# Patient Record
Sex: FEMALE | Race: WHITE | NOT HISPANIC OR LATINO | Employment: PART TIME | URBAN - METROPOLITAN AREA
[De-identification: names, ages, dates, MRNs, and addresses within clinical notes are randomized per-mention and may not be internally consistent; named-entity substitution may affect disease eponyms.]

---

## 2017-07-11 ENCOUNTER — PROCEDURE VISIT (OUTPATIENT)
Dept: OBSTETRICS AND GYNECOLOGY | Age: 62
End: 2017-07-11

## 2017-07-11 ENCOUNTER — OFFICE VISIT (OUTPATIENT)
Dept: OBSTETRICS AND GYNECOLOGY | Age: 62
End: 2017-07-11

## 2017-07-11 VITALS
BODY MASS INDEX: 23.21 KG/M2 | SYSTOLIC BLOOD PRESSURE: 120 MMHG | WEIGHT: 131 LBS | DIASTOLIC BLOOD PRESSURE: 80 MMHG | HEIGHT: 63 IN

## 2017-07-11 DIAGNOSIS — Z00.00 ANNUAL PHYSICAL EXAM: Primary | ICD-10-CM

## 2017-07-11 DIAGNOSIS — Z78.0 POST-MENOPAUSAL: Primary | ICD-10-CM

## 2017-07-11 DIAGNOSIS — M85.80 OSTEOPENIA: ICD-10-CM

## 2017-07-11 PROCEDURE — 99396 PREV VISIT EST AGE 40-64: CPT | Performed by: OBSTETRICS & GYNECOLOGY

## 2017-07-11 PROCEDURE — 77080 DXA BONE DENSITY AXIAL: CPT | Performed by: OBSTETRICS & GYNECOLOGY

## 2017-07-11 NOTE — PROGRESS NOTES
Subjective   Hanna Valladares is a 62 y.o. female is being seen today for   Chief Complaint   Patient presents with   • Gynecologic Exam     annual   .    History of Present Illness  Patient is here for an annual check.  Overall she is doing very well with no problems today nothing during the past year.  Her father is 87 has Parkinson's and some early dementia so she and her sister helped to care of him but he has a second wife who is a bit younger and she is very good also.  Her son Rashard just got  last month all seems happy with that.  No other complaints  The following portions of the patient's history were reviewed and updated as appropriate: allergies, current medications, past family history, past medical history, past social history, past surgical history and problem list.    PAST MEDICAL HISTORY  Past Medical History:   Diagnosis Date   • Depression    • Seasonal allergies      OB History      Para Term  AB TAB SAB Ectopic Multiple Living    2 2 2               Past Surgical History:   Procedure Laterality Date   • BREAST BIOPSY     • BUNIONECTOMY Right    • COLONOSCOPY N/A 2016    Procedure: COLONOSCOPY TO CECUM & T.I. WITH HOT SNARE POLYPECTOMIES AND COLD POLYPECTOMIES;  Surgeon: Beronica Jj MD;  Location: [facility] ENDOSCOPY;  Service:    • TONSILLECTOMY       Family History   Problem Relation Age of Onset   • No Known Problems Mother    • No Known Problems Father    • No Known Problems Sister    • No Known Problems Brother    • No Known Problems Daughter    • No Known Problems Son    • No Known Problems Maternal Grandmother    • No Known Problems Paternal Grandmother    • No Known Problems Maternal Aunt    • No Known Problems Paternal Aunt    • BRCA 1/2 Neg Hx    • Breast cancer Neg Hx    • Colon cancer Neg Hx    • Endometrial cancer Neg Hx    • Ovarian cancer Neg Hx      History   Smoking Status   • Never Smoker   Smokeless Tobacco   • Not on file       Current Outpatient  Prescriptions:   •  calcium carbonate-vitamin d (CALTRATE 600+D) 600-400 MG-UNIT per tablet, Take 1 tablet by mouth daily., Disp: , Rfl:   •  Loratadine-Pseudoephedrine (CLARITIN-D 24 HOUR PO), Take  by mouth., Disp: , Rfl:   •  pramipexole (MIRAPEX) 0.5 MG tablet, Take 0.5 mg by mouth 3 (three) times a day., Disp: , Rfl:   •  saccharomyces boulardii (FLORASTOR) 250 MG capsule, Take 250 mg by mouth 2 (two) times a day., Disp: , Rfl:   •  sertraline (ZOLOFT) 50 MG tablet, TAKE ONE TABLET BY MOUTH DAILY, Disp: 90 tablet, Rfl: 0    There is no immunization history on file for this patient.    Review of Systems   Constitutional: Negative for chills, fatigue, fever and unexpected weight change.   Respiratory: Negative for shortness of breath and wheezing.    Cardiovascular: Negative for chest pain.   Gastrointestinal: Negative for abdominal distention, abdominal pain, blood in stool, constipation, diarrhea and nausea.   Genitourinary: Negative for difficulty urinating, dyspareunia, dysuria, frequency, hematuria, menstrual problem, pelvic pain, urgency and vaginal discharge.   Skin: Negative for rash.       Objective   Physical Exam   Constitutional: She is oriented to person, place, and time. Vital signs are normal. She appears well-developed and well-nourished.   Neck: No thyromegaly present.   Cardiovascular: Normal rate, regular rhythm and normal heart sounds.    Pulmonary/Chest: Effort normal. Right breast exhibits no inverted nipple, no mass, no nipple discharge, no skin change and no tenderness. Left breast exhibits no inverted nipple, no mass, no nipple discharge, no skin change and no tenderness. Breasts are symmetrical. There is no breast swelling.   Abdominal: Soft.   Genitourinary: Vagina normal and uterus normal. No breast tenderness, discharge or bleeding. Pelvic exam was performed with patient supine. No labial fusion. There is no rash, tenderness, lesion or injury on the right labia. There is no rash,  tenderness, lesion or injury on the left labia. Cervix exhibits no motion tenderness, no discharge and no friability. Right adnexum displays no mass, no tenderness and no fullness. Left adnexum displays no mass, no tenderness and no fullness.   Neurological: She is alert and oriented to person, place, and time.   Psychiatric: She has a normal mood and affect.   Vitals reviewed.        Assessment/Plan   Hanna was seen today for gynecologic exam.    Diagnoses and all orders for this visit:    Annual physical exam    Osteopenia    Exam was normal today.  Pap smear will be due next year.  Mammogram is due today and done today.  Bone density all done today with the hip and spine showing some mild to moderate osteopenia and that was discussed with the patient.  And colonoscopy was last year and she is to come back in 3 years from August 2016.  Come back in year  Patient was counseled for having according to her age including diet exercise etc.

## 2018-07-17 ENCOUNTER — APPOINTMENT (OUTPATIENT)
Dept: WOMENS IMAGING | Facility: HOSPITAL | Age: 63
End: 2018-07-17

## 2018-07-17 ENCOUNTER — OFFICE VISIT (OUTPATIENT)
Dept: OBSTETRICS AND GYNECOLOGY | Age: 63
End: 2018-07-17

## 2018-07-17 VITALS
SYSTOLIC BLOOD PRESSURE: 118 MMHG | DIASTOLIC BLOOD PRESSURE: 80 MMHG | HEIGHT: 62 IN | WEIGHT: 136 LBS | BODY MASS INDEX: 25.03 KG/M2

## 2018-07-17 DIAGNOSIS — Z12.4 SCREENING FOR CERVICAL CANCER: ICD-10-CM

## 2018-07-17 DIAGNOSIS — Z11.51 SCREENING FOR HPV (HUMAN PAPILLOMAVIRUS): ICD-10-CM

## 2018-07-17 DIAGNOSIS — Z00.00 ANNUAL PHYSICAL EXAM: Primary | ICD-10-CM

## 2018-07-17 DIAGNOSIS — Z86.010 HISTORY OF COLON POLYPS: ICD-10-CM

## 2018-07-17 PROBLEM — Z86.0100 HISTORY OF COLON POLYPS: Status: ACTIVE | Noted: 2018-07-17

## 2018-07-17 PROCEDURE — 77067 SCR MAMMO BI INCL CAD: CPT | Performed by: RADIOLOGY

## 2018-07-17 PROCEDURE — 99396 PREV VISIT EST AGE 40-64: CPT | Performed by: OBSTETRICS & GYNECOLOGY

## 2018-07-17 RX ORDER — PRAMIPEXOLE DIHYDROCHLORIDE 0.5 MG/1
TABLET ORAL
COMMUNITY
Start: 2018-07-13

## 2018-07-17 NOTE — PROGRESS NOTES
Subjective   aHnna Valladares is a 63 y.o. female is being seen today for   Chief Complaint   Patient presents with   • Annual Exam     PT HERE FOR ROUTINE AE AND MG.   .    History of Present Illness  Patient is here for an annual exam.  Unfortunately her father passed away this past March at 88 area her family is doing well no grandchildren yet.  She has no specific complaints today nothing during the past year her bowels and bladder work well and she exercises place a lot of tenderness.  No other complaints  The following portions of the patient's history were reviewed and updated as appropriate: allergies, current medications, past family history, past medical history, past social history, past surgical history and problem list.    Vitals:    18 0951   BP: 118/80       PAST MEDICAL HISTORY  Past Medical History:   Diagnosis Date   • Depression    • Seasonal allergies      OB History      Para Term  AB Living    2 2 2          SAB TAB Ectopic Molar Multiple Live Births                       Past Surgical History:   Procedure Laterality Date   • BREAST BIOPSY     • BUNIONECTOMY Right    • COLONOSCOPY N/A 2016    Procedure: COLONOSCOPY TO CECUM & T.I. WITH HOT SNARE POLYPECTOMIES AND COLD POLYPECTOMIES;  Surgeon: Beronica Jj MD;  Location: [facility] ENDOSCOPY;  Service:    • TONSILLECTOMY       Family History   Problem Relation Age of Onset   • No Known Problems Mother    • No Known Problems Father    • No Known Problems Sister    • No Known Problems Brother    • No Known Problems Daughter    • No Known Problems Son    • No Known Problems Maternal Grandmother    • No Known Problems Paternal Grandmother    • No Known Problems Maternal Aunt    • No Known Problems Paternal Aunt    • BRCA 1/2 Neg Hx    • Breast cancer Neg Hx    • Colon cancer Neg Hx    • Endometrial cancer Neg Hx    • Ovarian cancer Neg Hx      History   Smoking Status   • Never Smoker   Smokeless Tobacco   • Not on file        Current Outpatient Prescriptions:   •  calcium carbonate-vitamin d (CALTRATE 600+D) 600-400 MG-UNIT per tablet, Take 1 tablet by mouth daily., Disp: , Rfl:   •  Loratadine-Pseudoephedrine (CLARITIN-D 24 HOUR PO), Take  by mouth., Disp: , Rfl:   •  pramipexole (MIRAPEX) 0.5 MG tablet, , Disp: , Rfl:   •  sertraline (ZOLOFT) 50 MG tablet, TAKE ONE TABLET BY MOUTH DAILY, Disp: 90 tablet, Rfl: 0    There is no immunization history on file for this patient.    Review of Systems   Constitutional: Negative for chills, fatigue, fever and unexpected weight change.   Respiratory: Negative for shortness of breath and wheezing.    Cardiovascular: Negative for chest pain.   Gastrointestinal: Negative for abdominal distention, abdominal pain, blood in stool, constipation, diarrhea and nausea.   Genitourinary: Negative for difficulty urinating, dyspareunia, dysuria, frequency, hematuria, menstrual problem, pelvic pain, urgency and vaginal discharge.   Skin: Negative for rash.       Objective   Physical Exam   Constitutional: She is oriented to person, place, and time. Vital signs are normal. She appears well-developed and well-nourished.   Neck: No thyromegaly present.   Cardiovascular: Normal rate, regular rhythm and normal heart sounds.    Pulmonary/Chest: Effort normal. Right breast exhibits no inverted nipple, no mass, no nipple discharge, no skin change and no tenderness. Left breast exhibits no inverted nipple, no mass, no nipple discharge, no skin change and no tenderness. Breasts are symmetrical. There is no breast swelling.   Abdominal: Soft.   Genitourinary: Vagina normal and uterus normal. No breast tenderness, discharge or bleeding. Pelvic exam was performed with patient supine. No labial fusion. There is no rash, tenderness, lesion or injury on the right labia. There is no rash, tenderness, lesion or injury on the left labia. Cervix exhibits no motion tenderness, no discharge and no friability. Right adnexum  displays no mass, no tenderness and no fullness. Left adnexum displays no mass, no tenderness and no fullness.   Neurological: She is alert and oriented to person, place, and time.   Psychiatric: She has a normal mood and affect.   Vitals reviewed.        Assessment/Plan   Hanna was seen today for annual exam.    Diagnoses and all orders for this visit:    Annual physical exam    History of colon polyps    My exam was normal today.  Mammogram was done today.  Pap smear done today.  Bone density last year with osteopenia and colonoscopy last year with some polyps so she is on either 3 or 5 year schedule again exercise discussed come back in a year

## 2018-07-20 LAB
CYTOLOGIST CVX/VAG CYTO: NORMAL
CYTOLOGY CVX/VAG DOC THIN PREP: NORMAL
DX ICD CODE: NORMAL
HIV 1 & 2 AB SER-IMP: NORMAL
HPV I/H RISK 4 DNA CVX QL PROBE+SIG AMP: NEGATIVE
OTHER STN SPEC: NORMAL
PATH REPORT.FINAL DX SPEC: NORMAL
STAT OF ADQ CVX/VAG CYTO-IMP: NORMAL

## 2019-07-19 ENCOUNTER — APPOINTMENT (OUTPATIENT)
Dept: WOMENS IMAGING | Facility: HOSPITAL | Age: 64
End: 2019-07-19

## 2019-07-19 ENCOUNTER — PROCEDURE VISIT (OUTPATIENT)
Dept: OBSTETRICS AND GYNECOLOGY | Age: 64
End: 2019-07-19

## 2019-07-19 ENCOUNTER — OFFICE VISIT (OUTPATIENT)
Dept: OBSTETRICS AND GYNECOLOGY | Age: 64
End: 2019-07-19

## 2019-07-19 VITALS
WEIGHT: 130 LBS | HEIGHT: 62 IN | SYSTOLIC BLOOD PRESSURE: 130 MMHG | DIASTOLIC BLOOD PRESSURE: 82 MMHG | BODY MASS INDEX: 23.92 KG/M2

## 2019-07-19 DIAGNOSIS — F41.9 ANXIETY: ICD-10-CM

## 2019-07-19 DIAGNOSIS — Z12.31 VISIT FOR SCREENING MAMMOGRAM: Primary | ICD-10-CM

## 2019-07-19 DIAGNOSIS — Z00.00 ANNUAL PHYSICAL EXAM: Primary | ICD-10-CM

## 2019-07-19 PROCEDURE — 99396 PREV VISIT EST AGE 40-64: CPT | Performed by: OBSTETRICS & GYNECOLOGY

## 2019-07-19 PROCEDURE — 77067 SCR MAMMO BI INCL CAD: CPT | Performed by: RADIOLOGY

## 2019-07-19 PROCEDURE — 77067 SCR MAMMO BI INCL CAD: CPT | Performed by: OBSTETRICS & GYNECOLOGY

## 2019-07-19 RX ORDER — FLUTICASONE PROPIONATE 50 MCG
SPRAY, SUSPENSION (ML) NASAL
COMMUNITY
Start: 2019-05-15

## 2019-07-19 NOTE — PROGRESS NOTES
Subjective   Hanna Valladares is a 64 y.o. female is being seen today for   Chief Complaint   Patient presents with   • Gynecologic Exam     AE and MG today.  2018 neg pap, neg HPV.  Dexa  = osteopenia.   C-scope 2016 = polyps.  PM no HRT.    .    History of Present Illness  Patient is here for annual exam.  Overall she doing very well medically with no new problems nothing during the past year.  Nothing in the family.  Her main problem today so to speak with her relationship with her daughter.  Seems that way back when when the daughter was a teenager she had a relationship that the parents do not approve a and that has I think on to the daughter ever since although she is happily  at this point.  But the relationship is strained.  At one point the receding seeing Jacque showed low ride recommended that seem to help some but bodies past so the patient is a would like to talk to someone even if the daughter would not.  Nothing else during the family bowels and bladder work well she is really doing her exercise room.  The following portions of the patient's history were reviewed and updated as appropriate: allergies, current medications, past family history, past medical history, past social history, past surgical history and problem list.    Vitals:    19 0926   BP: 130/82       PAST MEDICAL HISTORY  Past Medical History:   Diagnosis Date   • Colon polyps    • Depression    • Seasonal allergies      OB History      Para Term  AB Living    2 2 2     2    SAB TAB Ectopic Molar Multiple Live Births              2        Past Surgical History:   Procedure Laterality Date   • BREAST BIOPSY     • BUNIONECTOMY Right    • COLONOSCOPY N/A 2016    Procedure: COLONOSCOPY TO CECUM & T.I. WITH HOT SNARE POLYPECTOMIES AND COLD POLYPECTOMIES;  Surgeon: Beronica Jj MD;  Location: [facility] ENDOSCOPY;  Service:    • TONSILLECTOMY       Family History   Problem Relation Age of Onset   • No Known  Problems Mother    • No Known Problems Father    • No Known Problems Sister    • No Known Problems Brother    • No Known Problems Daughter    • No Known Problems Son    • No Known Problems Maternal Grandmother    • No Known Problems Paternal Grandmother    • No Known Problems Maternal Aunt    • No Known Problems Paternal Aunt    • BRCA 1/2 Neg Hx    • Breast cancer Neg Hx    • Colon cancer Neg Hx    • Endometrial cancer Neg Hx    • Ovarian cancer Neg Hx      Social History     Tobacco Use   Smoking Status Never Smoker   Smokeless Tobacco Never Used       Current Outpatient Medications:   •  calcium carbonate-vitamin d (CALTRATE 600+D) 600-400 MG-UNIT per tablet, Take 1 tablet by mouth daily., Disp: , Rfl:   •  fluticasone (FLONASE) 50 MCG/ACT nasal spray, , Disp: , Rfl:   •  Loratadine-Pseudoephedrine (CLARITIN-D 24 HOUR PO), Take  by mouth., Disp: , Rfl:   •  pramipexole (MIRAPEX) 0.5 MG tablet, , Disp: , Rfl:   •  sertraline (ZOLOFT) 50 MG tablet, TAKE ONE TABLET BY MOUTH DAILY, Disp: 90 tablet, Rfl: 3    There is no immunization history on file for this patient.    Review of Systems   Constitutional: Negative for chills, fatigue, fever and unexpected weight change.   Respiratory: Negative for shortness of breath and wheezing.    Cardiovascular: Negative for chest pain.   Gastrointestinal: Negative for abdominal distention, abdominal pain, blood in stool, constipation, diarrhea and nausea.   Genitourinary: Negative for difficulty urinating, dyspareunia, dysuria, frequency, hematuria, menstrual problem, pelvic pain, urgency and vaginal discharge.   Skin: Negative for rash.   Psychiatric/Behavioral: The patient is nervous/anxious.        Objective   Physical Exam   Constitutional: She is oriented to person, place, and time. Vital signs are normal. She appears well-developed and well-nourished.   Neck: No thyromegaly present.   Cardiovascular: Normal rate, regular rhythm and normal heart sounds.   Pulmonary/Chest:  Effort normal. Right breast exhibits no inverted nipple, no mass, no nipple discharge, no skin change and no tenderness. Left breast exhibits no inverted nipple, no mass, no nipple discharge, no skin change and no tenderness. Breasts are symmetrical. There is no breast swelling.   Abdominal: Soft.   Genitourinary: Vagina normal and uterus normal. No breast tenderness, discharge or bleeding. Pelvic exam was performed with patient supine. No labial fusion. There is no rash, tenderness, lesion or injury on the right labia. There is no rash, tenderness, lesion or injury on the left labia. Cervix exhibits no motion tenderness, no discharge and no friability. Right adnexum displays no mass, no tenderness and no fullness. Left adnexum displays no mass, no tenderness and no fullness.   Neurological: She is alert and oriented to person, place, and time.   Psychiatric: She has a normal mood and affect.   Vitals reviewed.        Assessment/Plan   Hanna was seen today for gynecologic exam.    Diagnoses and all orders for this visit:    Annual physical exam    Anxiety      Overall normal exam today.  Pap was done in 18 so not done today.  Mammogram is being done today.  Bone density showed osteopenia 2 years ago moderate level and will repeat that next year.  In the colonoscopy was 3 years ago but showed 5 polyps with 3 tubular adenomas some can have her call her GI person and just to make sure the next one is at 3 or 5 years.  Again very good on the exercise and will have her back in a year  And will give her names of a couple people to talk to

## 2019-08-30 ENCOUNTER — PREP FOR SURGERY (OUTPATIENT)
Dept: OTHER | Facility: HOSPITAL | Age: 64
End: 2019-08-30

## 2019-08-30 DIAGNOSIS — D36.9 ADENOMATOUS POLYPS: Primary | ICD-10-CM

## 2019-11-08 ENCOUNTER — PREP FOR SURGERY (OUTPATIENT)
Dept: OTHER | Facility: HOSPITAL | Age: 64
End: 2019-11-08

## 2019-11-08 DIAGNOSIS — D12.6 ADENOMATOUS POLYP OF COLON, UNSPECIFIED PART OF COLON: Primary | ICD-10-CM

## 2019-11-14 PROBLEM — D12.6 ADENOMATOUS POLYP OF COLON: Status: ACTIVE | Noted: 2019-11-14

## 2020-05-20 ENCOUNTER — TELEPHONE (OUTPATIENT)
Dept: OBSTETRICS AND GYNECOLOGY | Age: 65
End: 2020-05-20

## 2020-06-22 ENCOUNTER — TELEPHONE (OUTPATIENT)
Dept: OBSTETRICS AND GYNECOLOGY | Age: 65
End: 2020-06-22

## 2020-08-07 NOTE — TELEPHONE ENCOUNTER
(Delia/ Vel pt) Pt is needing refills on her zoloft until her appt with Dr. Crowder on 9/4/2020, can we do this? Pharmacy verified.     [phone]

## 2020-09-04 ENCOUNTER — PROCEDURE VISIT (OUTPATIENT)
Dept: OBSTETRICS AND GYNECOLOGY | Age: 65
End: 2020-09-04

## 2020-09-04 ENCOUNTER — OFFICE VISIT (OUTPATIENT)
Dept: OBSTETRICS AND GYNECOLOGY | Age: 65
End: 2020-09-04

## 2020-09-04 ENCOUNTER — APPOINTMENT (OUTPATIENT)
Dept: WOMENS IMAGING | Facility: HOSPITAL | Age: 65
End: 2020-09-04

## 2020-09-04 VITALS
WEIGHT: 134 LBS | SYSTOLIC BLOOD PRESSURE: 112 MMHG | HEIGHT: 62 IN | BODY MASS INDEX: 24.66 KG/M2 | DIASTOLIC BLOOD PRESSURE: 72 MMHG

## 2020-09-04 DIAGNOSIS — M85.80 OSTEOPENIA, SENILE: ICD-10-CM

## 2020-09-04 DIAGNOSIS — Z12.31 VISIT FOR SCREENING MAMMOGRAM: Primary | ICD-10-CM

## 2020-09-04 DIAGNOSIS — Z78.0 POST-MENOPAUSAL: ICD-10-CM

## 2020-09-04 DIAGNOSIS — Z01.419 ENCOUNTER FOR GYNECOLOGICAL EXAMINATION WITHOUT ABNORMAL FINDING: Primary | ICD-10-CM

## 2020-09-04 PROCEDURE — 77080 DXA BONE DENSITY AXIAL: CPT | Performed by: OBSTETRICS & GYNECOLOGY

## 2020-09-04 PROCEDURE — 99397 PER PM REEVAL EST PAT 65+ YR: CPT | Performed by: OBSTETRICS & GYNECOLOGY

## 2020-09-04 PROCEDURE — 77067 SCR MAMMO BI INCL CAD: CPT | Performed by: OBSTETRICS & GYNECOLOGY

## 2020-09-04 PROCEDURE — 77067 SCR MAMMO BI INCL CAD: CPT | Performed by: RADIOLOGY

## 2020-09-04 RX ORDER — ALENDRONATE SODIUM 70 MG/1
70 TABLET ORAL
Qty: 4 TABLET | Refills: 12 | Status: SHIPPED | OUTPATIENT
Start: 2020-09-04 | End: 2021-09-09 | Stop reason: SDUPTHER

## 2020-09-04 NOTE — PROGRESS NOTES
Routine Annual Visit    2020    Patient: Hanna Valladares          MR#:[id number]      Chief Complaint   Patient presents with   • Gynecologic Exam     Former Delia pt. PM no HRT.   AE and MG, DEXA  before exam today. Last pap 2018 = neg, neg.  DEXA 2017 = ostoepenia.  C-scope 2016 polyps, she is repeating soon.          History of Present Illness    65 y.o. female  who presents for annual exam.   Feeling well  No regular exercise  dexa reviewed today and worsening osteopenia, especially at spine  Will start fosamax  mammo done today  UTD pap  CSC scheduled in near future  No other issues          No LMP recorded (lmp unknown). Patient is postmenopausal.  Obstetric History:  OB History        2    Para   2    Term   2            AB        Living   2       SAB        TAB        Ectopic        Molar        Multiple        Live Births   2               Menstrual History:     No LMP recorded (lmp unknown). Patient is postmenopausal.       Sexual History:       ________________________________________  Patient Active Problem List   Diagnosis   • Osteopenia   • History of colon polyps   • Anxiety   • Adenomatous polyp of colon       Past Medical History:   Diagnosis Date   • Colon polyps    • Depression    • Seasonal allergies        Past Surgical History:   Procedure Laterality Date   • BREAST BIOPSY     • BUNIONECTOMY Right    • COLONOSCOPY N/A 2016    Procedure: COLONOSCOPY TO CECUM & T.I. WITH HOT SNARE POLYPECTOMIES AND COLD POLYPECTOMIES;  Surgeon: Beronica Jj MD;  Location: [facility] ENDOSCOPY;  Service:    • D&C HYSTEROSCOPY ENDOMETRIAL ABLATION     • TONSILLECTOMY         Social History     Tobacco Use   Smoking Status Never Smoker   Smokeless Tobacco Never Used       has a current medication list which includes the following prescription(s): calcium carbonate-vitamin d, fluticasone, loratadine-pseudoephedrine, pramipexole, sertraline, and  "alendronate.  ________________________________________    Current contraception: post menopausal status  History of abnormal Pap smear: no  Family history of Breast cancer: no  Family history of uterine or ovarian cancer: no  Family History of colon cancer/colon polyps: no  History of abnormal mammogram: no      The following portions of the patient's history were reviewed and updated as appropriate: allergies, current medications, past family history, past medical history, past social history, past surgical history and problem list.    Review of Systems    Pertinent items are noted in HPI.     Objective   Physical Exam    /72   Ht 157.5 cm (62\")   Wt 60.8 kg (134 lb)   LMP  (LMP Unknown) Comment: NO HRT  Breastfeeding No   BMI 24.51 kg/m²    BP Readings from Last 3 Encounters:   09/04/20 112/72   07/19/19 130/82   07/17/18 118/80      Wt Readings from Last 3 Encounters:   09/04/20 60.8 kg (134 lb)   07/19/19 59 kg (130 lb)   07/17/18 61.7 kg (136 lb)      BMI: Estimated body mass index is 24.51 kg/m² as calculated from the following:    Height as of this encounter: 157.5 cm (62\").    Weight as of this encounter: 60.8 kg (134 lb).      General:   alert, appears stated age and cooperative   Abdomen: soft, non-tender, without masses or organomegaly   Breast: inspection negative, no nipple discharge or bleeding, no masses or nodularity palpable   Vulva: normal   Vagina: normal mucosa   Cervix: no cervical motion tenderness and no lesions   Uterus: normal size, mobile or non-tender   Adnexa: no mass, fullness, tenderness     Assessment:    1. Normal annual exam   Assessment     ICD-10-CM ICD-9-CM   1. Encounter for gynecological examination without abnormal finding Z01.419 V72.31   2. Osteopenia, senile M85.80 733.90     Plan:    Plan     [x]  Mammogram request made  []  PAP done  []  Labs:   []  GC/Chl/TV  [x]  DEXA scan   []  Referral for colonoscopy:       Hanna was seen today for gynecologic " exam.    Diagnoses and all orders for this visit:    Encounter for gynecological examination without abnormal finding    Osteopenia, senile    Other orders  -     alendronate (Fosamax) 70 MG tablet; Take 1 tablet by mouth Every 7 (Seven) Days.      Follow up 2 years      Counseling:  --Nutrition: Stressed importance of moderation and caloric balance, stressed fresh fruit and vegetables  --Exercise: Stressed the importance of regular exercise. 3-5 times weekly   - Discussed screening mammogram recommendations.   --Discussed benefits of screening colonoscopy- age 45 unless FH  --Discussed pap smear screening recommendations

## 2020-09-11 ENCOUNTER — TRANSCRIBE ORDERS (OUTPATIENT)
Dept: SLEEP MEDICINE | Facility: HOSPITAL | Age: 65
End: 2020-09-11

## 2020-09-11 DIAGNOSIS — Z01.818 OTHER SPECIFIED PRE-OPERATIVE EXAMINATION: Primary | ICD-10-CM

## 2020-09-14 ENCOUNTER — LAB (OUTPATIENT)
Dept: LAB | Facility: HOSPITAL | Age: 65
End: 2020-09-14

## 2020-09-14 ENCOUNTER — TELEPHONE (OUTPATIENT)
Dept: OBSTETRICS AND GYNECOLOGY | Age: 65
End: 2020-09-14

## 2020-09-14 DIAGNOSIS — Z01.818 OTHER SPECIFIED PRE-OPERATIVE EXAMINATION: ICD-10-CM

## 2020-09-14 PROCEDURE — U0004 COV-19 TEST NON-CDC HGH THRU: HCPCS

## 2020-09-14 PROCEDURE — C9803 HOPD COVID-19 SPEC COLLECT: HCPCS

## 2020-09-14 NOTE — TELEPHONE ENCOUNTER
I would have her check with the GI doctors office as they have specific instructions in regards to medications. They generally suggest that she can take her meds, but if she has any qualms about taking it, skipping it one day would be ok

## 2020-09-14 NOTE — TELEPHONE ENCOUNTER
(Vel pt) Pt called, pt would like to know if she still needs to take her fosamax medication on the day of her colonoscopy scheduled Wednesday @ 7 am.    Please advise.      [phone]

## 2020-09-15 LAB — SARS-COV-2 RNA RESP QL NAA+PROBE: NOT DETECTED

## 2020-09-16 ENCOUNTER — ANESTHESIA (OUTPATIENT)
Dept: GASTROENTEROLOGY | Facility: HOSPITAL | Age: 65
End: 2020-09-16

## 2020-09-16 ENCOUNTER — ANESTHESIA EVENT (OUTPATIENT)
Dept: GASTROENTEROLOGY | Facility: HOSPITAL | Age: 65
End: 2020-09-16

## 2020-09-16 ENCOUNTER — HOSPITAL ENCOUNTER (OUTPATIENT)
Facility: HOSPITAL | Age: 65
Setting detail: HOSPITAL OUTPATIENT SURGERY
Discharge: HOME | End: 2020-09-16
Attending: INTERNAL MEDICINE | Admitting: INTERNAL MEDICINE

## 2020-09-16 VITALS
TEMPERATURE: 98.4 F | HEIGHT: 63 IN | WEIGHT: 129.6 LBS | RESPIRATION RATE: 14 BRPM | OXYGEN SATURATION: 100 % | SYSTOLIC BLOOD PRESSURE: 148 MMHG | BODY MASS INDEX: 22.96 KG/M2 | HEART RATE: 60 BPM | DIASTOLIC BLOOD PRESSURE: 91 MMHG

## 2020-09-16 DIAGNOSIS — D12.6 ADENOMATOUS POLYP OF COLON, UNSPECIFIED PART OF COLON: ICD-10-CM

## 2020-09-16 PROCEDURE — 25010000002 PROPOFOL 10 MG/ML EMULSION: Performed by: ANESTHESIOLOGY

## 2020-09-16 PROCEDURE — 45380 COLONOSCOPY AND BIOPSY: CPT | Performed by: INTERNAL MEDICINE

## 2020-09-16 PROCEDURE — 45385 COLONOSCOPY W/LESION REMOVAL: CPT | Performed by: INTERNAL MEDICINE

## 2020-09-16 PROCEDURE — S0260 H&P FOR SURGERY: HCPCS | Performed by: INTERNAL MEDICINE

## 2020-09-16 PROCEDURE — 88305 TISSUE EXAM BY PATHOLOGIST: CPT | Performed by: INTERNAL MEDICINE

## 2020-09-16 RX ORDER — PROPOFOL 10 MG/ML
VIAL (ML) INTRAVENOUS CONTINUOUS PRN
Status: DISCONTINUED | OUTPATIENT
Start: 2020-09-16 | End: 2020-09-16 | Stop reason: SURG

## 2020-09-16 RX ORDER — PROPOFOL 10 MG/ML
VIAL (ML) INTRAVENOUS AS NEEDED
Status: DISCONTINUED | OUTPATIENT
Start: 2020-09-16 | End: 2020-09-16 | Stop reason: SURG

## 2020-09-16 RX ORDER — LIDOCAINE HYDROCHLORIDE 20 MG/ML
INJECTION, SOLUTION INFILTRATION; PERINEURAL AS NEEDED
Status: DISCONTINUED | OUTPATIENT
Start: 2020-09-16 | End: 2020-09-16 | Stop reason: SURG

## 2020-09-16 RX ORDER — SODIUM CHLORIDE, SODIUM LACTATE, POTASSIUM CHLORIDE, CALCIUM CHLORIDE 600; 310; 30; 20 MG/100ML; MG/100ML; MG/100ML; MG/100ML
1000 INJECTION, SOLUTION INTRAVENOUS CONTINUOUS
Status: DISCONTINUED | OUTPATIENT
Start: 2020-09-16 | End: 2020-09-16 | Stop reason: HOSPADM

## 2020-09-16 RX ADMIN — LIDOCAINE HYDROCHLORIDE 100 MG: 20 INJECTION, SOLUTION INFILTRATION; PERINEURAL at 08:03

## 2020-09-16 RX ADMIN — PROPOFOL 120 MG: 10 INJECTION, EMULSION INTRAVENOUS at 08:03

## 2020-09-16 RX ADMIN — SODIUM CHLORIDE, POTASSIUM CHLORIDE, SODIUM LACTATE AND CALCIUM CHLORIDE 1000 ML: 600; 310; 30; 20 INJECTION, SOLUTION INTRAVENOUS at 07:41

## 2020-09-16 RX ADMIN — PROPOFOL 200 MCG/KG/MIN: 10 INJECTION, EMULSION INTRAVENOUS at 08:03

## 2020-09-16 RX ADMIN — SODIUM CHLORIDE, POTASSIUM CHLORIDE, SODIUM LACTATE AND CALCIUM CHLORIDE: 600; 310; 30; 20 INJECTION, SOLUTION INTRAVENOUS at 08:00

## 2020-09-16 NOTE — H&P
[facility]  Pre Procedure History & Physical    Chief Complaint:   History of colon polyps    Subjective     HPI:   65-year-old female with a past medical history significant for colon polyps for surveillance colonoscopy.  Her last colonoscopy was in 2016.  She had 3 adenomatous polyps at that time that were removed, one was a tubulovillous adenoma.  No family history of colon cancer or polyps.    Past Medical History:   Past Medical History:   Diagnosis Date   • Colon polyps    • Depression    • Osteoporosis    • Seasonal allergies        Past Surgical History:  Past Surgical History:   Procedure Laterality Date   • BREAST BIOPSY     • BUNIONECTOMY Right    • COLONOSCOPY N/A 8/30/2016    Procedure: COLONOSCOPY TO CECUM & T.I. WITH HOT SNARE POLYPECTOMIES AND COLD POLYPECTOMIES;  Surgeon: Beronica Jj MD;  Location: [facility] ENDOSCOPY;  Service:    • D&C HYSTEROSCOPY ENDOMETRIAL ABLATION     • TONSILLECTOMY         Family History:  Family History   Problem Relation Age of Onset   • No Known Problems Mother    • No Known Problems Father    • Leukemia Sister    • No Known Problems Brother    • No Known Problems Daughter    • No Known Problems Son    • No Known Problems Maternal Grandmother    • No Known Problems Paternal Grandmother    • No Known Problems Maternal Aunt    • No Known Problems Paternal Aunt    • Colon cancer Maternal Grandfather    • BRCA 1/2 Neg Hx    • Breast cancer Neg Hx    • Endometrial cancer Neg Hx    • Ovarian cancer Neg Hx        Social History:   reports that she has quit smoking. She has never used smokeless tobacco. She reports current alcohol use. She reports that she does not use drugs.    Medications:   Medications Prior to Admission   Medication Sig Dispense Refill Last Dose   • fluticasone (FLONASE) 50 MCG/ACT nasal spray    9/16/2020 at Unknown time   • Loratadine-Pseudoephedrine (CLARITIN-D 24 HOUR PO) Take  by mouth.   9/15/2020 at Unknown time   •  "pramipexole (MIRAPEX) 0.5 MG tablet    9/15/2020 at Unknown time   • sertraline (ZOLOFT) 50 MG tablet Take 1 tablet by mouth Daily. 90 tablet 0 9/15/2020 at Unknown time   • alendronate (Fosamax) 70 MG tablet Take 1 tablet by mouth Every 7 (Seven) Days. 4 tablet 12 9/9/2020   • calcium carbonate-vitamin d (CALTRATE 600+D) 600-400 MG-UNIT per tablet Take 1 tablet by mouth daily.   9/10/2020       Allergies:  Patient has no known allergies.    ROS:    Pertinent items are noted in HPI, all other systems reviewed and negative     Objective     Blood pressure 139/83, pulse 64, temperature 98.4 °F (36.9 °C), temperature source Oral, resp. rate 12, height 160 cm (63\"), weight 58.8 kg (129 lb 9.6 oz), SpO2 99 %, not currently breastfeeding.    Physical Exam   Constitutional: Pt is oriented to person, place, and time and well-developed, well-nourished, and in no distress.   Mouth/Throat: Oropharynx is clear and moist.   Neck: Normal range of motion.   Cardiovascular: Normal rate, regular rhythm and normal heart sounds.    Pulmonary/Chest: Effort normal and breath sounds normal.   Abdominal: Soft. Nontender  Skin: Skin is warm and dry.   Psychiatric: Mood, memory, affect and judgment normal.     Assessment/Plan     Diagnosis:  History of adenomatous colon polyps    Anticipated Surgical Procedure:  Colonoscopy    The risks, benefits, and alternatives of this procedure have been discussed with the patient or the responsible party- the patient understands and agrees to proceed.                                                            "

## 2020-09-16 NOTE — ANESTHESIA POSTPROCEDURE EVALUATION
"Patient: Hanna Valladares    Procedure Summary     Date: 09/16/20 Room / Location: [facility] ENDOSCOPY 10 / [facility] ENDOSCOPY    Anesthesia Start: 0800 Anesthesia Stop: 0842    Procedure: COLONOSCOPY TO CECUM AND TI WITH HOT AND COLD SNARE POLYPECTOMIES  (N/A ) Diagnosis:       Adenomatous polyp of colon, unspecified part of colon      (Adenomatous polyp of colon, unspecified part of colon [D12.6])    Surgeon: Beronica Jj MD Provider: Emmanuel Kebede MD    Anesthesia Type: MAC ASA Status: 2          Anesthesia Type: MAC    Vitals  Vitals Value Taken Time   BP 94/65 09/16/20 0842   Temp     Pulse 61 09/16/20 0842   Resp 12 09/16/20 0842   SpO2 99 % 09/16/20 0842           Post Anesthesia Care and Evaluation    Patient location during evaluation: bedside  Patient participation: complete - patient participated  Level of consciousness: sleepy but conscious  Pain score: 0  Pain management: adequate  Airway patency: patent  Anesthetic complications: No anesthetic complications    Cardiovascular status: acceptable  Respiratory status: acceptable  Hydration status: acceptable    Comments: BP 94/65 (BP Location: Left arm, Patient Position: Lying)   Pulse 61   Temp 36.9 °C (98.4 °F) (Oral)   Resp 12   Ht 160 cm (63\")   Wt 58.8 kg (129 lb 9.6 oz)   LMP  (LMP Unknown) Comment: NO HRT  SpO2 99%   BMI 22.96 kg/m²         "

## 2020-09-16 NOTE — ANESTHESIA PREPROCEDURE EVALUATION
Anesthesia Evaluation     Patient summary reviewed and Nursing notes reviewed   NPO Solid Status: > 8 hours  NPO Liquid Status: > 4 hours           Airway   Mallampati: II  Neck ROM: full  No difficulty expected  Dental - normal exam     Pulmonary     breath sounds clear to auscultation  Cardiovascular     Rhythm: regular        Neuro/Psych  (+) psychiatric history Depression,     GI/Hepatic/Renal/Endo      Musculoskeletal     Abdominal    Substance History      OB/GYN          Other                        Anesthesia Plan    ASA 2     MAC     intravenous induction     Anesthetic plan, all risks, benefits, and alternatives have been provided, discussed and informed consent has been obtained with: patient.

## 2020-09-17 LAB
CYTO UR: NORMAL
LAB AP CASE REPORT: NORMAL
PATH REPORT.FINAL DX SPEC: NORMAL
PATH REPORT.GROSS SPEC: NORMAL

## 2020-09-18 ENCOUNTER — TELEPHONE (OUTPATIENT)
Dept: GASTROENTEROLOGY | Facility: CLINIC | Age: 65
End: 2020-09-18

## 2020-09-18 NOTE — TELEPHONE ENCOUNTER
4 of The polyp(s) biopsies showed adenomatous change. This is not cancerous but is considered potentially precancerous. Follow-up colonoscopy in 3 years is advised.

## 2021-03-19 ENCOUNTER — BULK ORDERING (OUTPATIENT)
Dept: CASE MANAGEMENT | Facility: OTHER | Age: 66
End: 2021-03-19

## 2021-03-19 DIAGNOSIS — Z23 IMMUNIZATION DUE: ICD-10-CM

## 2021-09-09 ENCOUNTER — PROCEDURE VISIT (OUTPATIENT)
Dept: OBSTETRICS AND GYNECOLOGY | Age: 66
End: 2021-09-09

## 2021-09-09 ENCOUNTER — OFFICE VISIT (OUTPATIENT)
Dept: OBSTETRICS AND GYNECOLOGY | Age: 66
End: 2021-09-09

## 2021-09-09 ENCOUNTER — APPOINTMENT (OUTPATIENT)
Dept: WOMENS IMAGING | Facility: HOSPITAL | Age: 66
End: 2021-09-09

## 2021-09-09 VITALS
HEIGHT: 63 IN | DIASTOLIC BLOOD PRESSURE: 82 MMHG | BODY MASS INDEX: 23.74 KG/M2 | WEIGHT: 134 LBS | SYSTOLIC BLOOD PRESSURE: 126 MMHG

## 2021-09-09 DIAGNOSIS — M85.80 OSTEOPENIA, SENILE: ICD-10-CM

## 2021-09-09 DIAGNOSIS — Z01.419 ENCOUNTER FOR GYNECOLOGICAL EXAMINATION WITHOUT ABNORMAL FINDING: Primary | ICD-10-CM

## 2021-09-09 DIAGNOSIS — Z12.31 VISIT FOR SCREENING MAMMOGRAM: Primary | ICD-10-CM

## 2021-09-09 PROCEDURE — 77067 SCR MAMMO BI INCL CAD: CPT | Performed by: OBSTETRICS & GYNECOLOGY

## 2021-09-09 PROCEDURE — 77063 BREAST TOMOSYNTHESIS BI: CPT | Performed by: OBSTETRICS & GYNECOLOGY

## 2021-09-09 PROCEDURE — 77063 BREAST TOMOSYNTHESIS BI: CPT | Performed by: RADIOLOGY

## 2021-09-09 PROCEDURE — 99397 PER PM REEVAL EST PAT 65+ YR: CPT | Performed by: OBSTETRICS & GYNECOLOGY

## 2021-09-09 PROCEDURE — 77067 SCR MAMMO BI INCL CAD: CPT | Performed by: RADIOLOGY

## 2021-09-09 RX ORDER — ALENDRONATE SODIUM 70 MG/1
70 TABLET ORAL
Qty: 12 TABLET | Refills: 3 | Status: SHIPPED | OUTPATIENT
Start: 2021-09-09 | End: 2022-08-18 | Stop reason: SDUPTHER

## 2021-09-09 NOTE — PROGRESS NOTES
Routine Annual Visit    2021    Patient: Hanna Valladares          MR#:[id number]      Chief Complaint   Patient presents with   • Follow-up     mammogram today. Pt has no complaints today.        History of Present Illness    66 y.o. female  who presents for annual exam.   Scheduled as follow up but pt thought of this as AE, had regular insurance  Following for osteopenia and on fosamax doing well, will repeat dexa in 1 year  mammo due - worked in today  Went to [place] with family for a vacation  Got covid vac  [place], walks for exercise, used to play tennis but not so much recently  CSC last year  Pap not indicated          No LMP recorded (lmp unknown). Patient is postmenopausal.  Obstetric History:  OB History        2    Para   2    Term   2            AB        Living   2       SAB        TAB        Ectopic        Molar        Multiple        Live Births   2               Menstrual History:     No LMP recorded (lmp unknown). Patient is postmenopausal.       Sexual History:       ________________________________________  Patient Active Problem List   Diagnosis   • Osteopenia   • History of colon polyps   • Anxiety   • Adenomatous polyp of colon       Past Medical History:   Diagnosis Date   • Colon polyps    • Depression    • Osteoporosis    • Seasonal allergies        Past Surgical History:   Procedure Laterality Date   • BREAST BIOPSY     • BUNIONECTOMY Right    • COLONOSCOPY N/A 2016    Procedure: COLONOSCOPY TO CECUM & T.I. WITH HOT SNARE POLYPECTOMIES AND COLD POLYPECTOMIES;  Surgeon: Beronica Jj MD;  Location: [facility] ENDOSCOPY;  Service:    • COLONOSCOPY N/A 2020    Procedure: COLONOSCOPY TO CECUM AND TI WITH HOT AND COLD SNARE POLYPECTOMIES ;  Surgeon: Beronica Jj MD;  Location: [facility] ENDOSCOPY;  Service: Gastroenterology;  Laterality: N/A;  PRE- HX COLON POLYPS  POST- POLYPS, DIVERTICULOSIS, HEMORRHOIDS   • D & C HYSTEROSCOPY ENDOMETRIAL ABLATION     •  "TONSILLECTOMY         Social History     Tobacco Use   Smoking Status Former Smoker   Smokeless Tobacco Never Used   Tobacco Comment    quit 45 years ago       has a current medication list which includes the following prescription(s): alendronate, calcium carbonate-vitamin d, fluticasone, loratadine-pseudoephedrine, pramipexole, and sertraline.  ________________________________________    Current contraception: post menopausal status  History of abnormal Pap smear: no  Family history of Breast cancer: no  Family history of uterine or ovarian cancer: no  Family History of colon cancer/colon polyps: no  History of abnormal mammogram: no      The following portions of the patient's history were reviewed and updated as appropriate: allergies, current medications, past family history, past medical history, past social history, past surgical history and problem list.    Review of Systems    Pertinent items are noted in HPI.     Objective   Physical Exam    /82   Ht 160 cm (63\")   Wt 60.8 kg (134 lb)   LMP  (LMP Unknown) Comment: NO HRT  Breastfeeding No   BMI 23.74 kg/m²    BP Readings from Last 3 Encounters:   09/09/21 126/82   09/16/20 148/91   09/04/20 112/72      Wt Readings from Last 3 Encounters:   09/09/21 60.8 kg (134 lb)   09/16/20 58.8 kg (129 lb 9.6 oz)   09/04/20 60.8 kg (134 lb)      BMI: Estimated body mass index is 23.74 kg/m² as calculated from the following:    Height as of this encounter: 160 cm (63\").    Weight as of this encounter: 60.8 kg (134 lb).      General:   alert, appears stated age and cooperative   Abdomen: soft, non-tender, without masses or organomegaly   Breast: inspection negative, no nipple discharge or bleeding, no masses or nodularity palpable   Vulva: normal, Bartholin's, Urethra, [place]'s normal   Vagina: normal mucosa   Cervix: no cervical motion tenderness and no lesions   Uterus: normal size, mobile and non-tender   Adnexa: no mass, fullness, tenderness   Normal " external rectal exam     Assessment:    1. Normal annual exam   Assessment     ICD-10-CM ICD-9-CM   1. Encounter for gynecological examination without abnormal finding  Z01.419 V72.31   2. Osteopenia, senile  M85.80 733.90     Plan:    Plan     [x]  Mammogram request made  []  PAP done  []  Labs:   []  GC/Chl/TV  []  DEXA scan   []  Referral for colonoscopy:       Diagnoses and all orders for this visit:    1. Encounter for gynecological examination without abnormal finding (Primary)    2. Osteopenia, senile    Other orders  -     alendronate (Fosamax) 70 MG tablet; Take 1 tablet by mouth Every 7 (Seven) Days.  Dispense: 12 tablet; Refill: 3      dexa scan next year , continue fosamax      Counseling:  --Nutrition: Stressed importance of moderation and caloric balance, stressed fresh fruit and vegetables  --Exercise: Stressed the importance of regular exercise. 3-5 times weekly   - Discussed screening mammogram recommendations.   --Discussed benefits of screening colonoscopy- age 45 unless FH  --Discussed pap smear screening recommendations

## 2021-12-06 ENCOUNTER — TELEPHONE (OUTPATIENT)
Dept: OBSTETRICS AND GYNECOLOGY | Age: 66
End: 2021-12-06

## 2022-08-18 RX ORDER — ALENDRONATE SODIUM 70 MG/1
70 TABLET ORAL
Qty: 12 TABLET | Refills: 3 | Status: SHIPPED | OUTPATIENT
Start: 2022-08-18

## 2022-09-28 ENCOUNTER — PROCEDURE VISIT (OUTPATIENT)
Dept: OBSTETRICS AND GYNECOLOGY | Age: 67
End: 2022-09-28

## 2022-09-28 ENCOUNTER — APPOINTMENT (OUTPATIENT)
Dept: WOMENS IMAGING | Facility: HOSPITAL | Age: 67
End: 2022-09-28

## 2022-09-28 DIAGNOSIS — Z12.31 VISIT FOR SCREENING MAMMOGRAM: Primary | ICD-10-CM

## 2022-09-28 DIAGNOSIS — Z78.0 POST-MENOPAUSAL: Primary | ICD-10-CM

## 2022-09-28 PROCEDURE — 77067 SCR MAMMO BI INCL CAD: CPT | Performed by: OBSTETRICS & GYNECOLOGY

## 2022-09-28 PROCEDURE — 77080 DXA BONE DENSITY AXIAL: CPT | Performed by: OBSTETRICS & GYNECOLOGY

## 2022-09-28 PROCEDURE — 77067 SCR MAMMO BI INCL CAD: CPT | Performed by: RADIOLOGY

## 2022-09-28 PROCEDURE — 77063 BREAST TOMOSYNTHESIS BI: CPT | Performed by: RADIOLOGY

## 2022-09-28 PROCEDURE — 77063 BREAST TOMOSYNTHESIS BI: CPT | Performed by: OBSTETRICS & GYNECOLOGY

## 2022-10-04 ENCOUNTER — TELEPHONE (OUTPATIENT)
Dept: OBSTETRICS AND GYNECOLOGY | Age: 67
End: 2022-10-04

## 2022-10-04 NOTE — TELEPHONE ENCOUNTER
Placed call to patient to review Dexa Scan. Results Per Dr. Crowder: BMD has improved! Patient should conceder staying on the Fosamax for now.  Spoke with patient, relayed results and directions. Patient stated understanding, no other questions at this time.

## 2022-10-19 ENCOUNTER — OFFICE VISIT (OUTPATIENT)
Dept: OBSTETRICS AND GYNECOLOGY | Age: 67
End: 2022-10-19

## 2022-10-19 VITALS
BODY MASS INDEX: 23.04 KG/M2 | DIASTOLIC BLOOD PRESSURE: 70 MMHG | SYSTOLIC BLOOD PRESSURE: 120 MMHG | WEIGHT: 130 LBS | HEIGHT: 63 IN

## 2022-10-19 DIAGNOSIS — Z12.4 SCREENING FOR CERVICAL CANCER: ICD-10-CM

## 2022-10-19 DIAGNOSIS — M85.80 OSTEOPENIA, UNSPECIFIED LOCATION: ICD-10-CM

## 2022-10-19 DIAGNOSIS — Z01.419 WELL WOMAN EXAM WITH ROUTINE GYNECOLOGICAL EXAM: Primary | ICD-10-CM

## 2022-10-19 PROCEDURE — 99397 PER PM REEVAL EST PAT 65+ YR: CPT | Performed by: NURSE PRACTITIONER

## 2022-10-19 NOTE — PROGRESS NOTES
Subjective     Chief Complaint   Patient presents with   • Gynecologic Exam     AE, 2018 neg/hpv neg, mg 2022, dexa 2022, dexa 2020, csy 2020  cc; no prob;lems       History of Present Illness    Hanna Valladares is a 67 y.o.  who presents for annual exam.    Doing well  Mammo, dexa and colonoscopy UTD  She is postmenopausal - denies vaginal bleeding, no HRT  She is taking fosamax, calcium and vitamin D for osteopenia  She works outdoors gardening, walks and sometimes plays pickleball   No GYN concerns or complaints  Pt of Dr. Crowder      Obstetric History:  OB History        2    Para   2    Term   2            AB        Living   2       SAB        IAB        Ectopic        Molar        Multiple        Live Births   2               Menstrual History:     No LMP recorded (lmp unknown). Patient is postmenopausal.         Current contraception: post menopausal status  History of abnormal Pap smear: no  Received Gardasil immunization: no  Perform regular self breast exam: yes - .  Family history of uterine or ovarian cancer: no  Family History of colon cancer: yes - MGF  Family history of breast cancer: no    Mammogram: up to date.  Colonoscopy: up to date.  DEXA: up to date.    Exercise: moderately active  Calcium/Vitamin D: uses supplements    The following portions of the patient's history were reviewed and updated as appropriate: allergies, current medications, past family history, past medical history, past social history, past surgical history and problem list.    Review of Systems   Constitutional: Negative.    Respiratory: Negative.    Cardiovascular: Negative.    Gastrointestinal: Negative.    Genitourinary: Negative.    Skin: Negative.    Psychiatric/Behavioral: Negative.            Objective   Physical Exam  Constitutional:       General: She is awake.      Appearance: Normal appearance. She is well-developed.   HENT:      Head: Normocephalic and atraumatic.       Nose: Nose normal.   Neck:      Thyroid: No thyroid mass, thyromegaly or thyroid tenderness.   Cardiovascular:      Rate and Rhythm: Normal rate and regular rhythm.      Pulses: Normal pulses.      Heart sounds: Normal heart sounds.   Pulmonary:      Effort: Pulmonary effort is normal.      Breath sounds: Normal breath sounds.   Chest:   Breasts:     Breasts are symmetrical.      Right: Normal. No swelling, bleeding, inverted nipple, mass, nipple discharge, skin change or tenderness.      Left: Normal. No swelling, bleeding, inverted nipple, mass, nipple discharge, skin change or tenderness.   Abdominal:      General: Abdomen is flat. Bowel sounds are normal.      Palpations: Abdomen is soft.      Tenderness: There is no abdominal tenderness.   Genitourinary:     General: Normal vulva.      Labia:         Right: No rash, tenderness, lesion or injury.         Left: No rash, tenderness, lesion or injury.       Urethra: No prolapse, urethral pain, urethral swelling or urethral lesion.      Vagina: Normal. No signs of injury. No vaginal discharge, erythema, tenderness, bleeding, lesions or prolapsed vaginal walls.      Cervix: No discharge, friability, lesion, erythema or cervical bleeding.      Uterus: Normal. Not enlarged, not tender and no uterine prolapse.       Adnexa: Right adnexa normal and left adnexa normal.        Right: No mass, tenderness or fullness.          Left: No mass, tenderness or fullness.        Rectum: Normal. No mass.   Musculoskeletal:      Cervical back: Normal range of motion and neck supple.   Lymphadenopathy:      Upper Body:      Right upper body: No supraclavicular adenopathy.      Left upper body: No supraclavicular adenopathy.   Skin:     General: Skin is warm and dry.   Neurological:      General: No focal deficit present.      Mental Status: She is alert and oriented to person, place, and time.   Psychiatric:         Mood and Affect: Mood normal.         Behavior: Behavior normal.  "Behavior is cooperative.         Thought Content: Thought content normal.         Judgment: Judgment normal.         /70   Ht 160 cm (63\")   Wt 59 kg (130 lb)   LMP  (LMP Unknown) Comment: NO HRT  BMI 23.03 kg/m²     Assessment & Plan   Diagnoses and all orders for this visit:    1. Well woman exam with routine gynecological exam (Primary)    2. Osteopenia, unspecified location    3. Screening for cervical cancer  -     IGP, Apt HPV,rfx 16 / 18,45        All questions answered.  Breast self exam technique reviewed and patient encouraged to perform self-exam monthly.  Discussed healthy lifestyle modifications.  Recommended 30 minutes of aerobic exercise five times per week.  Discussed calcium needs to prevent osteoporosis.    -Pap today, if normal no longer needs paps  -Mammo, colonoscopy and dexa UTD  -F/u 1 year               "

## 2022-10-26 LAB
CYTOLOGIST CVX/VAG CYTO: NORMAL
CYTOLOGY CVX/VAG DOC CYTO: NORMAL
CYTOLOGY CVX/VAG DOC THIN PREP: NORMAL
DX ICD CODE: NORMAL
HIV 1 & 2 AB SER-IMP: NORMAL
HPV I/H RISK 4 DNA CVX QL PROBE+SIG AMP: NEGATIVE
Lab: NORMAL
OTHER STN SPEC: NORMAL
STAT OF ADQ CVX/VAG CYTO-IMP: NORMAL

## 2023-08-03 DIAGNOSIS — Z12.31 SCREENING MAMMOGRAM FOR BREAST CANCER: Primary | ICD-10-CM

## 2023-09-25 RX ORDER — ALENDRONATE SODIUM 70 MG/1
70 TABLET ORAL
Qty: 12 TABLET | Refills: 3 | Status: SHIPPED | OUTPATIENT
Start: 2023-09-25

## 2023-10-23 ENCOUNTER — TELEPHONE (OUTPATIENT)
Dept: OBSTETRICS AND GYNECOLOGY | Age: 68
End: 2023-10-23

## 2023-10-23 NOTE — TELEPHONE ENCOUNTER
PT WANTING TO MOVE ANNUAL AND MAMMO SCHEDULED FOR 10/30/2023 BACK TO 10/27/2023 DUE TO WORK SCHEDULE - SHE IS FINE WITH SEEING AN APRN- ANYTIME IS FINE. - PLEASE ADVISE PATIENT -1135 FINE WITH CALLBACK AT ANYTIME, FINE WITH LVM      ALSO WANTING TO KNOW IF DEXA IS DUE

## 2023-11-14 ENCOUNTER — OFFICE VISIT (OUTPATIENT)
Dept: OBSTETRICS AND GYNECOLOGY | Age: 68
End: 2023-11-14
Payer: COMMERCIAL

## 2023-11-14 ENCOUNTER — HOSPITAL ENCOUNTER (OUTPATIENT)
Facility: HOSPITAL | Age: 68
Discharge: HOME | End: 2023-11-14
Admitting: OBSTETRICS & GYNECOLOGY
Payer: COMMERCIAL

## 2023-11-14 VITALS
HEIGHT: 63 IN | BODY MASS INDEX: 22.32 KG/M2 | SYSTOLIC BLOOD PRESSURE: 114 MMHG | DIASTOLIC BLOOD PRESSURE: 74 MMHG | WEIGHT: 126 LBS

## 2023-11-14 DIAGNOSIS — Z12.31 SCREENING MAMMOGRAM FOR BREAST CANCER: ICD-10-CM

## 2023-11-14 DIAGNOSIS — Z12.31 VISIT FOR SCREENING MAMMOGRAM: Primary | ICD-10-CM

## 2023-11-14 DIAGNOSIS — Z01.419 ENCOUNTER FOR GYNECOLOGICAL EXAMINATION WITHOUT ABNORMAL FINDING: Primary | ICD-10-CM

## 2023-11-14 PROCEDURE — 77063 BREAST TOMOSYNTHESIS BI: CPT

## 2023-11-14 PROCEDURE — 77067 SCR MAMMO BI INCL CAD: CPT

## 2023-11-14 NOTE — PROGRESS NOTES
Routine Annual Visit    2023    Patient: Hanna Valladares          MR#:[id number]      Chief Complaint   Patient presents with    Gynecologic Exam     Annual Exam - last pap 10/19/22 neg, mammo today, dexa 22, colonoscopy 20, pt has no complaints today       History of Present Illness    68 y.o. female  who presents for annual exam.     Patient is feeling well  She is having no vaginal bleeding and no GYN concerns  Her Pap is up-to-date  Mammogram today  DEXA scan was in   Colonoscopy is   She has been on Fosamax for about 5 years  We will take a break for the next 2 years and recheck a DEXA scan at her 2-year follow-up    Patient gets 10,000 steps a day and is very active  She does not do any formal weightbearing exercise but gets plenty of steps and  She did have a good response to Fosamax with her bone mineral density  She is not having any unsteadiness or falling  She has not had a fracture  She does have a family history of osteoporosis    The patient likes to go to [place] several times a year and she is going again soon    No LMP recorded (lmp unknown). Patient is postmenopausal.  Obstetric History:  OB History          2    Para   2    Term   2            AB        Living   2         SAB        IAB        Ectopic        Molar        Multiple        Live Births   2               Menstrual History:     No LMP recorded (lmp unknown). Patient is postmenopausal.       Sexual History:       ________________________________________  Patient Active Problem List   Diagnosis    Osteopenia    History of colon polyps    Anxiety    Adenomatous polyp of colon       Past Medical History:   Diagnosis Date    Colon polyps     Depression     Osteoporosis     Seasonal allergies        Past Surgical History:   Procedure Laterality Date    BREAST BIOPSY      BUNIONECTOMY Right     COLONOSCOPY N/A 2016    Procedure: COLONOSCOPY TO CECUM & T.I. WITH HOT SNARE POLYPECTOMIES  "AND COLD POLYPECTOMIES;  Surgeon: Beronica Jj MD;  Location: [facility] ENDOSCOPY;  Service:     COLONOSCOPY N/A 9/16/2020    Procedure: COLONOSCOPY TO CECUM AND TI WITH HOT AND COLD SNARE POLYPECTOMIES ;  Surgeon: Beronica Jj MD;  Location: [facility] ENDOSCOPY;  Service: Gastroenterology;  Laterality: N/A;  PRE- HX COLON POLYPS  POST- POLYPS, DIVERTICULOSIS, HEMORRHOIDS    D & C HYSTEROSCOPY ENDOMETRIAL ABLATION      TONSILLECTOMY         Social History     Tobacco Use   Smoking Status Former    Passive exposure: Past   Smokeless Tobacco Never   Tobacco Comments    quit 45 years ago       has a current medication list which includes the following prescription(s): alendronate, calcium carbonate-vitamin d, fluticasone, loratadine-pseudoephedrine, pramipexole, and sertraline.  ________________________________________    Current contraception: post menopausal status  History of abnormal Pap smear: no  Family history of Breast cancer: no  Family history of uterine or ovarian cancer: no  Family History of colon cancer/colon polyps: yes - MGF  History of abnormal mammogram: no      The following portions of the patient's history were reviewed and updated as appropriate: allergies, current medications, past family history, past medical history, past social history, past surgical history, and problem list.    Review of Systems    Pertinent items are noted in HPI.     Objective   Physical Exam    /74   Ht 160 cm (63\")   Wt 57.2 kg (126 lb)   LMP  (LMP Unknown) Comment: NO HRT  BMI 22.32 kg/m²    BP Readings from Last 3 Encounters:   11/14/23 114/74   10/19/22 120/70   09/09/21 126/82      Wt Readings from Last 3 Encounters:   11/14/23 57.2 kg (126 lb)   10/19/22 59 kg (130 lb)   09/09/21 60.8 kg (134 lb)      BMI: Estimated body mass index is 22.32 kg/m² as calculated from the following:    Height as of this encounter: 160 cm (63\").    Weight as of this encounter: 57.2 kg (126 lb).      General:   alert, " appears stated age, and cooperative   Abdomen: soft, non-tender, without masses or organomegaly   Breast: inspection negative, no nipple discharge or bleeding, no masses or nodularity palpable   Vulva: normal, Bartholin's, Urethra, [place]'s normal   Vagina: normal mucosa   Cervix: no cervical motion tenderness and no lesions   Uterus: normal size, mobile, and non-tender   Adnexa: no mass, fullness, tenderness     Assessment:    1. Normal annual exam   Assessment     ICD-10-CM ICD-9-CM   1. Encounter for gynecological examination without abnormal finding  Z01.419 V72.31     Plan:    Plan     [x]  Mammogram request made  []  PAP done  []  Labs:   []  GC/Chl/TV  []  DEXA scan   []  Referral for colonoscopy:       Diagnoses and all orders for this visit:    1. Encounter for gynecological examination without abnormal finding (Primary)      Follow-up 2 years  Fosamax holiday for the next 2 years  DEXA in 2 years      Counseling:  --Nutrition: Stressed importance of moderation and caloric balance, stressed fresh fruit and vegetables  --Exercise: Stressed the importance of regular exercise. 3-5 times weekly   - Discussed screening mammogram recommendations.   --Discussed benefits of screening colonoscopy- age 45 unless FH  --Discussed pap smear screening recommendations

## 2024-07-19 NOTE — TELEPHONE ENCOUNTER
(Delia pt) Pt left a voicemail requesting a call from Dr. Lin. Can you call this patient?     [phone]  
Pt requesting a call back and states she just wanted to talk to you before you retired and discuss who she should see in the future. I advised her it may be early next week before you could give her a call depending on how busy you were.   
no

## 2024-11-19 ENCOUNTER — HOSPITAL ENCOUNTER (OUTPATIENT)
Facility: HOSPITAL | Age: 69
Discharge: HOME | End: 2024-11-19
Admitting: OBSTETRICS & GYNECOLOGY
Payer: COMMERCIAL

## 2024-11-19 DIAGNOSIS — Z12.31 VISIT FOR SCREENING MAMMOGRAM: ICD-10-CM

## 2024-11-19 PROCEDURE — 77063 BREAST TOMOSYNTHESIS BI: CPT

## 2024-11-19 PROCEDURE — 77067 SCR MAMMO BI INCL CAD: CPT

## 2025-01-16 ENCOUNTER — PRE-ADMISSION TESTING (OUTPATIENT)
Dept: PREADMISSION TESTING | Facility: HOSPITAL | Age: 70
End: 2025-01-16
Payer: COMMERCIAL

## 2025-01-16 ENCOUNTER — ANESTHESIA EVENT (OUTPATIENT)
Dept: PERIOP | Facility: HOSPITAL | Age: 70
End: 2025-01-16
Payer: COMMERCIAL

## 2025-01-16 VITALS
TEMPERATURE: 97.3 F | OXYGEN SATURATION: 97 % | HEART RATE: 84 BPM | SYSTOLIC BLOOD PRESSURE: 128 MMHG | BODY MASS INDEX: 24.48 KG/M2 | DIASTOLIC BLOOD PRESSURE: 73 MMHG | HEIGHT: 62 IN | WEIGHT: 133 LBS | RESPIRATION RATE: 18 BRPM

## 2025-01-16 LAB
ALBUMIN SERPL-MCNC: 4.1 G/DL (ref 3.5–5.2)
ALBUMIN/GLOB SERPL: 1.5 G/DL
ALP SERPL-CCNC: 68 U/L (ref 39–117)
ALT SERPL W P-5'-P-CCNC: 14 U/L (ref 1–33)
ANION GAP SERPL CALCULATED.3IONS-SCNC: 8.9 MMOL/L (ref 5–15)
AST SERPL-CCNC: 21 U/L (ref 1–32)
BASOPHILS # BLD AUTO: 0.04 10*3/MM3 (ref 0–0.2)
BASOPHILS NFR BLD AUTO: 0.8 % (ref 0–1.5)
BILIRUB SERPL-MCNC: 0.2 MG/DL (ref 0–1.2)
BUN SERPL-MCNC: 15 MG/DL (ref 8–23)
BUN/CREAT SERPL: 15.6 (ref 7–25)
CALCIUM SPEC-SCNC: 10 MG/DL (ref 8.6–10.5)
CHLORIDE SERPL-SCNC: 104 MMOL/L (ref 98–107)
CO2 SERPL-SCNC: 28.1 MMOL/L (ref 22–29)
CREAT SERPL-MCNC: 0.96 MG/DL (ref 0.57–1)
DEPRECATED RDW RBC AUTO: 39.8 FL (ref 37–54)
EGFRCR SERPLBLD CKD-EPI 2021: 63.8 ML/MIN/1.73
EOSINOPHIL # BLD AUTO: 0.12 10*3/MM3 (ref 0–0.4)
EOSINOPHIL NFR BLD AUTO: 2.5 % (ref 0.3–6.2)
ERYTHROCYTE [DISTWIDTH] IN BLOOD BY AUTOMATED COUNT: 12.1 % (ref 12.3–15.4)
GLOBULIN UR ELPH-MCNC: 2.7 GM/DL
GLUCOSE SERPL-MCNC: 128 MG/DL (ref 65–99)
HCT VFR BLD AUTO: 42.2 % (ref 34–46.6)
HGB BLD-MCNC: 14.2 G/DL (ref 12–15.9)
IMM GRANULOCYTES # BLD AUTO: 0.01 10*3/MM3 (ref 0–0.05)
IMM GRANULOCYTES NFR BLD AUTO: 0.2 % (ref 0–0.5)
LYMPHOCYTES # BLD AUTO: 1.53 10*3/MM3 (ref 0.7–3.1)
LYMPHOCYTES NFR BLD AUTO: 31.5 % (ref 19.6–45.3)
MCH RBC QN AUTO: 30 PG (ref 26.6–33)
MCHC RBC AUTO-ENTMCNC: 33.6 G/DL (ref 31.5–35.7)
MCV RBC AUTO: 89.2 FL (ref 79–97)
MONOCYTES # BLD AUTO: 0.37 10*3/MM3 (ref 0.1–0.9)
MONOCYTES NFR BLD AUTO: 7.6 % (ref 5–12)
NEUTROPHILS NFR BLD AUTO: 2.79 10*3/MM3 (ref 1.7–7)
NEUTROPHILS NFR BLD AUTO: 57.4 % (ref 42.7–76)
NRBC BLD AUTO-RTO: 0 /100 WBC (ref 0–0.2)
PLATELET # BLD AUTO: 217 10*3/MM3 (ref 140–450)
PMV BLD AUTO: 10.7 FL (ref 6–12)
POTASSIUM SERPL-SCNC: 4.1 MMOL/L (ref 3.5–5.2)
PROT SERPL-MCNC: 6.8 G/DL (ref 6–8.5)
QT INTERVAL: 383 MS
QTC INTERVAL: 428 MS
RBC # BLD AUTO: 4.73 10*6/MM3 (ref 3.77–5.28)
SODIUM SERPL-SCNC: 141 MMOL/L (ref 136–145)
WBC NRBC COR # BLD AUTO: 4.86 10*3/MM3 (ref 3.4–10.8)

## 2025-01-16 PROCEDURE — 80053 COMPREHEN METABOLIC PANEL: CPT

## 2025-01-16 PROCEDURE — 85025 COMPLETE CBC W/AUTO DIFF WBC: CPT

## 2025-01-16 PROCEDURE — 93005 ELECTROCARDIOGRAM TRACING: CPT

## 2025-01-16 PROCEDURE — 36415 COLL VENOUS BLD VENIPUNCTURE: CPT

## 2025-01-16 RX ORDER — GINSENG 100 MG
1 CAPSULE ORAL NIGHTLY
COMMUNITY

## 2025-01-16 NOTE — DISCHARGE INSTRUCTIONS
Take the following medications the morning of surgery:      ZOLFOT    If you are on prescription narcotic pain medication to control your pain you may also take that medication the morning of surgery.      General Instructions:     Do not eat solid food after midnight the night before surgery.  Clear liquids day of surgery are allowed but must be stopped at least two hours before your hospital arrival time.       Allowed clear liquids      Water, sodas, and tea or coffee with no cream or milk added.       12 to 20 ounces of a clear liquid that contains carbohydrates is recommended.  If non-diabetic, have Gatorade or Powerade.  If diabetic, have G2 or Powerade Zero.     Do not have liquids red in color.  Do not consume chicken, beef, pork or vegetable broth or bouillon cubes of any variety as they are not considered clear liquids and are not allowed.      Infants may have breast milk up to four hours before surgery.  Infants drinking formula may drink formula up to six hours before surgery.   Patients who avoid smoking, chewing tobacco and alcohol for 4 weeks prior to surgery have a reduced risk of post-operative complications.  Quit smoking as many days before surgery as you can.  Do not smoke, use chewing tobacco or drink alcohol the day of surgery.   If applicable bring your C-PAP/ BI-PAP machine in with you to preop day of surgery.  Bring any papers given to you in the doctor’s office.  Wear clean comfortable clothes.  Do not wear contact lenses, false eyelashes or make-up.  Bring a case for your glasses.   Bring crutches or walker if applicable.  Remove all piercings.  Leave jewelry and any other valuables at home.  Hair extensions with metal clips must be removed prior to surgery.  The Pre-Admission Testing nurse will instruct you to bring medications if unable to obtain an accurate list in Pre-Admission Testing.            Preventing a Surgical Site Infection:  For 2 to 3 days before surgery, avoid shaving  with a razor because the razor can irritate skin and make it easier to develop an infection.    Any areas of open skin can increase the risk of a post-operative wound infection by allowing bacteria to enter and travel throughout the body.  Notify your surgeon if you have any skin wounds / rashes even if it is not near the expected surgical site.  The area will need assessed to determine if surgery should be delayed until it is healed.  The night prior to surgery shower using a fresh bar of anti-bacterial soap (such as Dial) and clean washcloth.  Sleep in a clean bed with clean clothing.  Do not allow pets to sleep with you.  Shower on the morning of surgery using a fresh bar of anti-bacterial soap (such as Dial) and clean washcloth.  Dry with a clean towel and dress in clean clothing.  Ask your surgeon if you will be receiving antibiotics prior to surgery.  Make sure you, your family, and all healthcare providers clean their hands with soap and water or an alcohol based hand  before caring for you or your wound.    Day of surgery:  Your arrival time is approximately two hours before your scheduled surgery time.  Please note if you have an early arrival time the surgery doors do not open before 5:00 AM.  Upon arrival, a Pre-op nurse and Anesthesiologist will review your health history, obtain vital signs, and answer questions you may have.  The only belongings needed at this time will be a list of your home medications and if applicable your C-PAP/BI-PAP machine.  A Pre-op nurse will start an IV and you may receive medication in preparation for surgery, including something to help you relax.     Please be aware that surgery does come with discomfort.  We want to make every effort to control your discomfort so please discuss any uncontrolled symptoms with your nurse.   Your doctor will most likely have prescribed pain medications.      If you are going home after surgery you will receive individualized  written care instructions before being discharged.  A responsible adult must drive you to and from the hospital on the day of your surgery and ideally stay with you through the night.   .  Discharge prescriptions can be filled by the hospital pharmacy during regular pharmacy hours.  If you are having surgery late in the day/evening your prescription may be e-prescribed to your pharmacy.  Please verify your pharmacy hours or chose a 24 hour pharmacy to avoid not having access to your prescription because your pharmacy has closed for the day.    If you are staying overnight following surgery, you will be transported to your hospital room following the recovery period.  [facility] has all private rooms.    If you have any questions please call Pre-Admission Testing at [phone].  Deductibles and co-payments are collected on the day of service. Please be prepared to pay the required co-pay, deductible or deposit on the day of service as defined by your plan.    Call your surgeon immediately if you experience any of the following symptoms:  Sore Throat  Shortness of Breath or difficulty breathing  Cough  Chills  Body soreness or muscle pain  Headache  Fever  New loss of taste or smell  Do not arrive for your surgery ill.  Your procedure will need to be rescheduled to another time.  You will need to call your physician before the day of surgery to avoid any unnecessary exposure to hospital staff as well as other patients.

## 2025-01-20 ENCOUNTER — APPOINTMENT (OUTPATIENT)
Dept: GENERAL RADIOLOGY | Facility: HOSPITAL | Age: 70
End: 2025-01-20
Payer: COMMERCIAL

## 2025-01-20 ENCOUNTER — HOSPITAL ENCOUNTER (OUTPATIENT)
Facility: HOSPITAL | Age: 70
Setting detail: HOSPITAL OUTPATIENT SURGERY
Discharge: HOME | End: 2025-01-20
Attending: ORTHOPAEDIC SURGERY | Admitting: ORTHOPAEDIC SURGERY
Payer: COMMERCIAL

## 2025-01-20 ENCOUNTER — ANESTHESIA (OUTPATIENT)
Dept: PERIOP | Facility: HOSPITAL | Age: 70
End: 2025-01-20
Payer: COMMERCIAL

## 2025-01-20 VITALS
RESPIRATION RATE: 16 BRPM | SYSTOLIC BLOOD PRESSURE: 123 MMHG | HEART RATE: 75 BPM | OXYGEN SATURATION: 97 % | DIASTOLIC BLOOD PRESSURE: 78 MMHG | TEMPERATURE: 97.7 F

## 2025-01-20 DIAGNOSIS — M20.12 HALLUX VALGUS (ACQUIRED), LEFT FOOT: Primary | ICD-10-CM

## 2025-01-20 PROCEDURE — C1713 ANCHOR/SCREW BN/BN,TIS/BN: HCPCS | Performed by: ORTHOPAEDIC SURGERY

## 2025-01-20 PROCEDURE — 25810000003 LACTATED RINGERS PER 1000 ML: Performed by: ANESTHESIOLOGY

## 2025-01-20 PROCEDURE — 25010000002 DEXAMETHASONE PER 1 MG: Performed by: ANESTHESIOLOGY

## 2025-01-20 PROCEDURE — 25010000002 PROPOFOL 200 MG/20ML EMULSION

## 2025-01-20 PROCEDURE — C1769 GUIDE WIRE: HCPCS | Performed by: ORTHOPAEDIC SURGERY

## 2025-01-20 PROCEDURE — 76000 FLUOROSCOPY <1 HR PHYS/QHP: CPT

## 2025-01-20 PROCEDURE — 25010000002 CEFAZOLIN PER 500 MG: Performed by: ORTHOPAEDIC SURGERY

## 2025-01-20 PROCEDURE — 25010000002 LIDOCAINE PF 2% 2 % SOLUTION

## 2025-01-20 PROCEDURE — 25010000002 ONDANSETRON PER 1 MG

## 2025-01-20 PROCEDURE — 25010000002 FENTANYL CITRATE (PF) 50 MCG/ML SOLUTION: Performed by: ANESTHESIOLOGY

## 2025-01-20 PROCEDURE — 25010000002 MIDAZOLAM PER 1 MG: Performed by: ANESTHESIOLOGY

## 2025-01-20 PROCEDURE — 25010000002 BUPIVACAINE (PF) 0.5 % SOLUTION: Performed by: ANESTHESIOLOGY

## 2025-01-20 DEVICE — STAPLE KIT, REFLEX MINI, AKIN, 10 X
Type: IMPLANTABLE DEVICE | Site: FOOT | Status: FUNCTIONAL
Brand: MEDLINE

## 2025-01-20 DEVICE — SCREW, SNAP-OFF, 2.0 X 14MM
Type: IMPLANTABLE DEVICE | Site: FOOT | Status: FUNCTIONAL
Brand: PENDING

## 2025-01-20 DEVICE — IMPLANTABLE DEVICE: Type: IMPLANTABLE DEVICE | Site: FOOT | Status: FUNCTIONAL

## 2025-01-20 DEVICE — SCREW, SNAP-OFF, 2.7 X 17MM
Type: IMPLANTABLE DEVICE | Site: FOOT | Status: FUNCTIONAL
Brand: PENDING

## 2025-01-20 RX ORDER — ONDANSETRON 2 MG/ML
4 INJECTION INTRAMUSCULAR; INTRAVENOUS ONCE AS NEEDED
Status: DISCONTINUED | OUTPATIENT
Start: 2025-01-20 | End: 2025-01-20 | Stop reason: HOSPADM

## 2025-01-20 RX ORDER — IPRATROPIUM BROMIDE AND ALBUTEROL SULFATE 2.5; .5 MG/3ML; MG/3ML
3 SOLUTION RESPIRATORY (INHALATION) ONCE AS NEEDED
Status: DISCONTINUED | OUTPATIENT
Start: 2025-01-20 | End: 2025-01-20 | Stop reason: HOSPADM

## 2025-01-20 RX ORDER — LIDOCAINE HYDROCHLORIDE 20 MG/ML
INJECTION, SOLUTION EPIDURAL; INFILTRATION; INTRACAUDAL; PERINEURAL AS NEEDED
Status: DISCONTINUED | OUTPATIENT
Start: 2025-01-20 | End: 2025-01-20 | Stop reason: SURG

## 2025-01-20 RX ORDER — HYDROMORPHONE HYDROCHLORIDE 1 MG/ML
0.5 INJECTION, SOLUTION INTRAMUSCULAR; INTRAVENOUS; SUBCUTANEOUS
Status: DISCONTINUED | OUTPATIENT
Start: 2025-01-20 | End: 2025-01-20 | Stop reason: HOSPADM

## 2025-01-20 RX ORDER — PROMETHAZINE HYDROCHLORIDE 25 MG/1
25 TABLET ORAL ONCE AS NEEDED
Status: DISCONTINUED | OUTPATIENT
Start: 2025-01-20 | End: 2025-01-20 | Stop reason: HOSPADM

## 2025-01-20 RX ORDER — DIPHENHYDRAMINE HYDROCHLORIDE 50 MG/ML
12.5 INJECTION INTRAMUSCULAR; INTRAVENOUS
Status: DISCONTINUED | OUTPATIENT
Start: 2025-01-20 | End: 2025-01-20 | Stop reason: HOSPADM

## 2025-01-20 RX ORDER — PROCHLORPERAZINE EDISYLATE 5 MG/ML
10 INJECTION INTRAMUSCULAR; INTRAVENOUS EVERY 6 HOURS PRN
Status: DISCONTINUED | OUTPATIENT
Start: 2025-01-20 | End: 2025-01-20 | Stop reason: HOSPADM

## 2025-01-20 RX ORDER — LIDOCAINE HYDROCHLORIDE 10 MG/ML
0.5 INJECTION, SOLUTION INFILTRATION; PERINEURAL ONCE AS NEEDED
Status: DISCONTINUED | OUTPATIENT
Start: 2025-01-20 | End: 2025-01-20 | Stop reason: HOSPADM

## 2025-01-20 RX ORDER — PROMETHAZINE HYDROCHLORIDE 25 MG/1
25 SUPPOSITORY RECTAL ONCE AS NEEDED
Status: DISCONTINUED | OUTPATIENT
Start: 2025-01-20 | End: 2025-01-20 | Stop reason: HOSPADM

## 2025-01-20 RX ORDER — NALOXONE HCL 0.4 MG/ML
0.2 VIAL (ML) INJECTION AS NEEDED
Status: DISCONTINUED | OUTPATIENT
Start: 2025-01-20 | End: 2025-01-20 | Stop reason: HOSPADM

## 2025-01-20 RX ORDER — PROPOFOL 10 MG/ML
INJECTION, EMULSION INTRAVENOUS AS NEEDED
Status: DISCONTINUED | OUTPATIENT
Start: 2025-01-20 | End: 2025-01-20 | Stop reason: SURG

## 2025-01-20 RX ORDER — SODIUM CHLORIDE 0.9 % (FLUSH) 0.9 %
3-10 SYRINGE (ML) INJECTION AS NEEDED
Status: DISCONTINUED | OUTPATIENT
Start: 2025-01-20 | End: 2025-01-20 | Stop reason: HOSPADM

## 2025-01-20 RX ORDER — HYDROCODONE BITARTRATE AND ACETAMINOPHEN 5; 325 MG/1; MG/1
1-2 TABLET ORAL EVERY 6 HOURS PRN
Qty: 30 TABLET | Refills: 0 | Status: SHIPPED | OUTPATIENT
Start: 2025-01-20

## 2025-01-20 RX ORDER — OXYCODONE AND ACETAMINOPHEN 7.5; 325 MG/1; MG/1
1 TABLET ORAL EVERY 4 HOURS PRN
Status: DISCONTINUED | OUTPATIENT
Start: 2025-01-20 | End: 2025-01-20 | Stop reason: HOSPADM

## 2025-01-20 RX ORDER — BUPIVACAINE HYDROCHLORIDE 5 MG/ML
INJECTION, SOLUTION EPIDURAL; INTRACAUDAL
Status: COMPLETED | OUTPATIENT
Start: 2025-01-20 | End: 2025-01-20

## 2025-01-20 RX ORDER — FENTANYL CITRATE 50 UG/ML
50 INJECTION, SOLUTION INTRAMUSCULAR; INTRAVENOUS
Status: DISCONTINUED | OUTPATIENT
Start: 2025-01-20 | End: 2025-01-20 | Stop reason: HOSPADM

## 2025-01-20 RX ORDER — LABETALOL HYDROCHLORIDE 5 MG/ML
5 INJECTION, SOLUTION INTRAVENOUS
Status: DISCONTINUED | OUTPATIENT
Start: 2025-01-20 | End: 2025-01-20 | Stop reason: HOSPADM

## 2025-01-20 RX ORDER — FENTANYL CITRATE 50 UG/ML
50 INJECTION, SOLUTION INTRAMUSCULAR; INTRAVENOUS ONCE AS NEEDED
Status: COMPLETED | OUTPATIENT
Start: 2025-01-20 | End: 2025-01-20

## 2025-01-20 RX ORDER — ONDANSETRON 4 MG/1
4 TABLET, FILM COATED ORAL EVERY 8 HOURS PRN
Qty: 20 TABLET | Refills: 0 | Status: SHIPPED | OUTPATIENT
Start: 2025-01-20 | End: 2025-02-19

## 2025-01-20 RX ORDER — EPHEDRINE SULFATE 50 MG/ML
INJECTION INTRAVENOUS AS NEEDED
Status: DISCONTINUED | OUTPATIENT
Start: 2025-01-20 | End: 2025-01-20 | Stop reason: SURG

## 2025-01-20 RX ORDER — ATROPINE SULFATE 0.4 MG/ML
0.4 INJECTION, SOLUTION INTRAMUSCULAR; INTRAVENOUS; SUBCUTANEOUS ONCE AS NEEDED
Status: DISCONTINUED | OUTPATIENT
Start: 2025-01-20 | End: 2025-01-20 | Stop reason: HOSPADM

## 2025-01-20 RX ORDER — MIDAZOLAM HYDROCHLORIDE 1 MG/ML
0.5 INJECTION, SOLUTION INTRAMUSCULAR; INTRAVENOUS
Status: COMPLETED | OUTPATIENT
Start: 2025-01-20 | End: 2025-01-20

## 2025-01-20 RX ORDER — HYDROCODONE BITARTRATE AND ACETAMINOPHEN 5; 325 MG/1; MG/1
1 TABLET ORAL ONCE AS NEEDED
Status: DISCONTINUED | OUTPATIENT
Start: 2025-01-20 | End: 2025-01-20 | Stop reason: HOSPADM

## 2025-01-20 RX ORDER — DEXAMETHASONE SODIUM PHOSPHATE 4 MG/ML
INJECTION, SOLUTION INTRA-ARTICULAR; INTRALESIONAL; INTRAMUSCULAR; INTRAVENOUS; SOFT TISSUE
Status: COMPLETED | OUTPATIENT
Start: 2025-01-20 | End: 2025-01-20

## 2025-01-20 RX ORDER — SODIUM CHLORIDE 0.9 % (FLUSH) 0.9 %
3 SYRINGE (ML) INJECTION EVERY 12 HOURS SCHEDULED
Status: DISCONTINUED | OUTPATIENT
Start: 2025-01-20 | End: 2025-01-20 | Stop reason: HOSPADM

## 2025-01-20 RX ORDER — FLUMAZENIL 0.1 MG/ML
0.2 INJECTION INTRAVENOUS AS NEEDED
Status: DISCONTINUED | OUTPATIENT
Start: 2025-01-20 | End: 2025-01-20 | Stop reason: HOSPADM

## 2025-01-20 RX ORDER — EPHEDRINE SULFATE 50 MG/ML
5 INJECTION, SOLUTION INTRAVENOUS ONCE AS NEEDED
Status: DISCONTINUED | OUTPATIENT
Start: 2025-01-20 | End: 2025-01-20 | Stop reason: HOSPADM

## 2025-01-20 RX ORDER — SODIUM CHLORIDE, SODIUM LACTATE, POTASSIUM CHLORIDE, CALCIUM CHLORIDE 600; 310; 30; 20 MG/100ML; MG/100ML; MG/100ML; MG/100ML
9 INJECTION, SOLUTION INTRAVENOUS CONTINUOUS
Status: DISCONTINUED | OUTPATIENT
Start: 2025-01-20 | End: 2025-01-20 | Stop reason: HOSPADM

## 2025-01-20 RX ORDER — HYDRALAZINE HYDROCHLORIDE 20 MG/ML
5 INJECTION INTRAMUSCULAR; INTRAVENOUS
Status: DISCONTINUED | OUTPATIENT
Start: 2025-01-20 | End: 2025-01-20 | Stop reason: HOSPADM

## 2025-01-20 RX ORDER — DEXAMETHASONE SODIUM PHOSPHATE 4 MG/ML
INJECTION, SOLUTION INTRA-ARTICULAR; INTRALESIONAL; INTRAMUSCULAR; INTRAVENOUS; SOFT TISSUE AS NEEDED
Status: DISCONTINUED | OUTPATIENT
Start: 2025-01-20 | End: 2025-01-20 | Stop reason: SURG

## 2025-01-20 RX ORDER — FAMOTIDINE 10 MG/ML
20 INJECTION, SOLUTION INTRAVENOUS ONCE
Status: COMPLETED | OUTPATIENT
Start: 2025-01-20 | End: 2025-01-20

## 2025-01-20 RX ORDER — ONDANSETRON 2 MG/ML
INJECTION INTRAMUSCULAR; INTRAVENOUS AS NEEDED
Status: DISCONTINUED | OUTPATIENT
Start: 2025-01-20 | End: 2025-01-20 | Stop reason: SURG

## 2025-01-20 RX ADMIN — ONDANSETRON 4 MG: 2 INJECTION INTRAMUSCULAR; INTRAVENOUS at 15:28

## 2025-01-20 RX ADMIN — FAMOTIDINE 20 MG: 10 INJECTION INTRAVENOUS at 13:25

## 2025-01-20 RX ADMIN — MIDAZOLAM 1 MG: 1 INJECTION INTRAMUSCULAR; INTRAVENOUS at 13:05

## 2025-01-20 RX ADMIN — SODIUM CHLORIDE, POTASSIUM CHLORIDE, SODIUM LACTATE AND CALCIUM CHLORIDE 9 ML/HR: 600; 310; 30; 20 INJECTION, SOLUTION INTRAVENOUS at 12:22

## 2025-01-20 RX ADMIN — DEXAMETHASONE SODIUM PHOSPHATE 4 MG: 4 INJECTION, SOLUTION INTRA-ARTICULAR; INTRALESIONAL; INTRAMUSCULAR; INTRAVENOUS; SOFT TISSUE at 13:15

## 2025-01-20 RX ADMIN — FENTANYL CITRATE 50 MCG: 50 INJECTION, SOLUTION INTRAMUSCULAR; INTRAVENOUS at 13:06

## 2025-01-20 RX ADMIN — SODIUM CHLORIDE 2000 MG: 900 INJECTION INTRAVENOUS at 15:13

## 2025-01-20 RX ADMIN — LIDOCAINE HYDROCHLORIDE 60 MG: 20 INJECTION, SOLUTION EPIDURAL; INFILTRATION; INTRACAUDAL; PERINEURAL at 15:23

## 2025-01-20 RX ADMIN — MIDAZOLAM 1 MG: 1 INJECTION INTRAMUSCULAR; INTRAVENOUS at 13:08

## 2025-01-20 RX ADMIN — PROPOFOL 150 MG: 10 INJECTION, EMULSION INTRAVENOUS at 15:23

## 2025-01-20 RX ADMIN — DEXAMETHASONE SODIUM PHOSPHATE 4 MG: 4 INJECTION, SOLUTION INTRA-ARTICULAR; INTRALESIONAL; INTRAMUSCULAR; INTRAVENOUS; SOFT TISSUE at 15:28

## 2025-01-20 RX ADMIN — BUPIVACAINE HYDROCHLORIDE 20 ML: 5 INJECTION, SOLUTION EPIDURAL; INTRACAUDAL; PERINEURAL at 13:17

## 2025-01-20 RX ADMIN — EPHEDRINE SULFATE 5 MG: 50 INJECTION INTRAVENOUS at 16:08

## 2025-01-20 NOTE — OP NOTE
Procedure Note    Hanna Valladares  1/20/2025    Pre-op Diagnosis:   Left foot hallux valgus  Left foot second and third hammertoes  Left foot metatarsalgia       Post-Op Diagnosis Codes:  Same    Procedure:  Left foot chevron/Akin bunionectomy (44737)  Left second and third hammertoe corrections (28285 x2)  Left second and third plantar metatarsophalangeal joint soft tissue rearrangement/plantar plication (14040 x2)  Left second and third metatarsophalangeal joints correction of angular deformity (28313 x2)  Left second metatarsal Weil osteotomy (62423)      Surgeon(s):  Lew Guzman Jr., MD    Assistants:  None    Anesthesia: LMA with Block    Estimated Blood Loss: minimal    Specimens:                None     Drains: * No LDAs found *    Complications:   None apparent    Disposition:  Stable to PACU for cover    Indications for procedure:  The patient is a very pleasant 70-year-old female who has had progressive pain and dysfunction related to left foot hallux valgus with associated second and third hammertoes and metatarsalgia.  Her symptoms were refractory to prolonged nonoperative management.  She requested surgical intervention.  The above listed procedures were recommended.  The risks/benefits of surgery, including pain, infection, recurrent deformity, wound healing problems, need for future procedures, mal/nonunion, DVT/PE, nerve or vessel injury, cardiac event, and/or death were discussed, and the patient elected to proceed with surgery.      Procedure in detail:  The correct patient was identified in preoperative holding.  All risks and benefits of surgery were again discussed in detail, and the patient agreed to proceed with surgery.  The operative extremity was confirmed and marked.  Operative consent reviewed and confirmed to be signed.    At this time, the patient was wheeled to the operative theatre and placed supine on the OR table.  Anesthesia was induced smoothly by our anesthesia colleagues.   MIRTHA and SCD placed on nonoperative leg.  The left lower extremity was prepped and draped in standard sterile fashion.  Appropriate presurgical timeout was performed, confirming correct patient, correct extremity, correct procedure, availability of sterile instruments/implants, and the administration of intravenous antibiotics within one hour of skin incision.      A longitudinal medial-based incision was made, centered over the hallux MTP joint, and careful dissection carried down to the capsular layer, being sure to identify and protect the dorsomedial cutaneous nerve.  An inverted-L capsulotomy was made and soft tissues carefully reflected to gain appropriate exposure.  The medial eminence was removed with a microsagittal saw.  The distal Chevron cut was outlined on the medial surface of the first metatarsal head/neck and cut completed carefully with a microsagittal saw.  An appropriate amount of lateral shift of the metatarsal head was made, nicely correcting the intermetatarsal angle, as assessed with intraoperative, C arm fluoroscopy.  This was then secured with a 2.7mm Medline snap-off screw from dorsal to plantar.  A second 2.0 millimeter screw was placed for additional stability and rotational control.  Excess bone was then carefully removed and smoothed with a small rongeur and saw.  The joint was thoroughly irrigated.  Redundant medial capsule was excised and then repaired in a pants-over-vest fashion with 2-0 Vicryl suture.      Direct inspection and fluoroscopy revealed some residual lateral tilt to the hallux, therefore we proceeded with a closing wedge osteotomy at the base of the proximal phalanx.  This was carefully planned and completed with a microsagittal saw, utilizing a greenstick technique laterally.  The osteotomy was secured with a Medline Reflex staple which achieved good compression and stability, along with excellent alignment to the hallux.  Fluoroscopic AP/oblique/lateral views of the  foot confirmed excellent hallux and forefoot correction, alignment, and hardware placement in all planes.    Wounds were thoroughly irrigated and closed in a layered fashion with 2-0 Vicryl, 3-0 Vicryl, and 3-0 nylon sutures.      A dorsal longitudinal incision was made centered between the second and third metatarsal heads.  Dissection was carried down bluntly to the extensor tendons to the second toe.  The extensor brevis was transected.  The extensor longus was lengthened.  Standard T-shaped capsulotomy was performed accessing the metatarsophalangeal joint.  Appropriate collateral ligament release and soft tissue balancing was performed to correct angular deformity at the joint.  A microsagittal saw was then used to perform a standard shortening osteotomy of the metatarsal.  This was stabilized with a single 2.0 mm snap off screw from the Medline set with excellent compression and stability.  A rongeur was used to perform exostectomy of any prominent bone edges at the metatarsal head dorsally and plantarly.    Attention was then turned to the third ray.  The extensor brevis was transected.  The extensor longus was lengthened.  Standard T-shaped capsulotomy was performed accessing the metatarsophalangeal joint.  Appropriate collateral ligament release and balancing was performed to correct angular deformity at the joint.  A rongeur was used to perform exostectomy of any prominent bone edges at the metatarsal head dorsally and plantarly.    At this time, fluoroscopy confirmed excellent maintenance of forefoot cascade and appropriate position of the screw.    Attention was then turned to the second toe.  An elliptical incision was made centered over the subluxed PIP joint.  Collaterals were released in appropriate fashion to gain access to the joint.  Soft tissue retractors were used to protect all neurovascular structures.  The distal aspect of the proximal phalanx was transected using the microsagittal saw.   Appropriate condylectomy performed.  A rongeur was then used to prepare the articular surface of the base of the middle phalanx.      Attention was then turned to the third toe.  An elliptical incision was made centered over the subluxed PIP joint.  Collaterals were released in appropriate fashion to gain access to the joint.  Soft tissue retractors were used to protect all neurovascular structures.  The distal aspect of the proximal phalanx was transected using the microsagittal saw.  Appropriate condylectomy performed.  A rongeur was then used to prepare the articular surface of the base of the middle phalanx.      Guidewires from the Medline 3.0 millimeter headless partially-threaded screw set were then placed in retrograde fashion at both the second and third toes across the PIP arthrodesis sites.  Ideal wire placements were confirmed.  These were measured, drilled, and appropriate length 3.0 mm headless partially-threaded screws placed in retrograde fashion across the second toe and the third toe PIP arthrodesis sites, respectively with excellent compression and maintained alignment.    Next, evaluation of the plantar soft tissue and forefoot alignment with simulated weightbearing with a flat plate revealed that the patient had redundant soft tissue plantarly.  The redundant plantar forefoot soft tissue was excised at both the plantar aspects of both the second and third metatarsophalangeal joints.  The resultant soft tissue defects and flaps measured approximately 1 cm², respectively.  The borders of each flap were undermined.  Great care was taken to protect the perforators of the digital vessels.  A releasing incision was made proximally to allow for mobilization of the tissues.  The borders of the flaps were then advanced and rotated to minimize the defect.  The wounds were then irrigated thoroughly and closed in layers with 3-0 nylon on the skin.    At this time, the forefoot was examined clinically with  simulated weightbearing with a flat plate.  All deformity had been appropriately corrected.  Fluoroscopy was used in AP, oblique, lateral planes to confirm appropriate placement of all hardware, maintenance of proper forefoot cascade, and evidence of good apposition at the PIP arthrodesis sites.    Wounds were copiously irrigated with sterile saline.  The Z-lengthening to each extensor longus to each toe was repaired with 3-0 Vicryl.  Tourniquet let down.  Hemostasis achieved.  Brisk capillary refill returned to all toes which pinked up quickly.  Incisions were then closed with 3-0 Vicryl in buried interrupted fashion and then 3-0 nylon on the skin.      A standard bunion dressing was placed.  Patient awoken from anesthesia without apparent complication and taken to PACU for recovery.            Lew Guzman Jr, MD     Date: 1/20/2025  Time: 16:42 EST

## 2025-01-20 NOTE — ANESTHESIA PROCEDURE NOTES
Airway  Urgency: elective    Date/Time: 1/20/2025 3:25 PM  Airway not difficult    General Information and Staff    Patient location during procedure: OR  CRNA/CAA: Nikko Calix CRNA    Indications and Patient Condition  Indications for airway management: airway protection    Preoxygenated: yes  Mask difficulty assessment: 0 - not attempted    Final Airway Details  Final airway type: supraglottic airway      Successful airway: LMA  Size 4     Number of attempts at approach: 1  Assessment: lips, teeth, and gum same as pre-op

## 2025-01-20 NOTE — ANESTHESIA PROCEDURE NOTES
Peripheral Block    Pre-sedation assessment completed: 1/20/2025 1:05 PM    Patient reassessed immediately prior to procedure    Patient location during procedure: pre-op  Start time: 1/20/2025 1:06 PM  Stop time: 1/20/2025 1:15 PM  Reason for block: at surgeon's request and post-op pain management  Performed by  Anesthesiologist: Luis Armando Fall MD  Preanesthetic Checklist  Completed: patient identified, IV checked, site marked, risks and benefits discussed, surgical consent, monitors and equipment checked, pre-op evaluation and timeout performed  Prep:  Sterile barriers:gloves  Prep: ChloraPrep  Patient monitoring: blood pressure monitoring, continuous pulse oximetry and EKG  Procedure    Sedation: yes    Guidance:ultrasound guided    ULTRASOUND INTERPRETATION.  Using ultrasound guidance a 22 G gauge needle was placed in close proximity to the sciatic nerve, at which point, under ultrasound guidance anesthetic was injected in the area of the nerve and spread of the anesthesia was seen on ultrasound in close proximity thereto.  There were no abnormalities seen on ultrasound; a digital image was taken; and the patient tolerated the procedure with no complications. Images:still images obtained    Laterality:left  Block Type:sciatic and popliteal  Injection Technique:single-shot  Needle Type:short-bevel  Needle Gauge:22 G      Medications Used: dexamethasone (DECADRON) injection - Injection   4 mg - 1/20/2025 1:15:00 PM  bupivacaine (PF) (MARCAINE) 0.5 % injection - Injection   20 mL - 1/20/2025 1:17:00 PM      Medications  Comment:Ultrasound Interpretation:  Using ultrasound guidance the needle was placed in close proximity to the nerve and anesthetic was injected in the area of the nerve and spread of the anesthetic was seen on ultrasound in close proximity thereto.  There were no abnormalities seen on ultrasound; a digital image was taken; and the patient tolerated the procedure with no complications.         Post Assessment  Injection Assessment: negative aspiration for heme, no paresthesia on injection and incremental injection  Patient Tolerance:comfortable throughout block  Complications:no  Performed by: Luis Armando Fall MD

## 2025-01-20 NOTE — ANESTHESIA PREPROCEDURE EVALUATION
Anesthesia Evaluation     no history of anesthetic complications:   NPO Solid Status: > 8 hours             Airway   Mallampati: II  TM distance: >3 FB  Neck ROM: full  No difficulty expected  Dental - normal exam     Pulmonary    (-) wheezes  Cardiovascular     ECG reviewed  Rhythm: regular        Neuro/Psych  GI/Hepatic/Renal/Endo      Musculoskeletal     Abdominal    Substance History      OB/GYN          Other                    Anesthesia Plan    ASA 2     general     (  D/W R&B of GA including but not limited to: heart, lung, liver, kidney, neurologic problems, positioning injuries, dental damage, corneal abrasion and TMJ.  .    )  intravenous induction     Anesthetic plan, risks, benefits, and alternatives have been provided, discussed and informed consent has been obtained with: patient.    CODE STATUS:

## 2025-01-20 NOTE — INTERVAL H&P NOTE
H&P reviewed. The patient was examined and there are no changes to the H&P.      The risks/benefits of surgery, including pain, infection, wound healing problems, need for future procedures, mal/nonunion, recurrent deformity, DVT/PE, nerve or vessel injury, cardiac event, and/or death were discussed, and the patient elected to proceed with surgery.

## 2025-04-10 ENCOUNTER — APPOINTMENT (OUTPATIENT)
Dept: GENERAL RADIOLOGY | Facility: HOSPITAL | Age: 70
End: 2025-04-10
Payer: COMMERCIAL

## 2025-04-10 ENCOUNTER — HOSPITAL ENCOUNTER (EMERGENCY)
Facility: HOSPITAL | Age: 70
Discharge: HOME | End: 2025-04-10
Attending: EMERGENCY MEDICINE
Payer: COMMERCIAL

## 2025-04-10 VITALS
RESPIRATION RATE: 16 BRPM | HEART RATE: 93 BPM | SYSTOLIC BLOOD PRESSURE: 153 MMHG | DIASTOLIC BLOOD PRESSURE: 90 MMHG | TEMPERATURE: 98 F | OXYGEN SATURATION: 97 %

## 2025-04-10 DIAGNOSIS — S63.502A SPRAIN OF LEFT WRIST, INITIAL ENCOUNTER: Primary | ICD-10-CM

## 2025-04-10 PROCEDURE — 99283 EMERGENCY DEPT VISIT LOW MDM: CPT

## 2025-04-10 PROCEDURE — 73110 X-RAY EXAM OF WRIST: CPT

## 2025-04-10 RX ORDER — IBUPROFEN 800 MG/1
800 TABLET, FILM COATED ORAL ONCE
Status: COMPLETED | OUTPATIENT
Start: 2025-04-10 | End: 2025-04-10

## 2025-04-10 RX ORDER — ACETAMINOPHEN 500 MG
1000 TABLET ORAL ONCE
Status: COMPLETED | OUTPATIENT
Start: 2025-04-10 | End: 2025-04-10

## 2025-04-10 RX ADMIN — ACETAMINOPHEN 1000 MG: 500 TABLET, FILM COATED ORAL at 19:21

## 2025-04-10 RX ADMIN — IBUPROFEN 800 MG: 800 TABLET, FILM COATED ORAL at 19:21

## 2025-04-10 NOTE — ED NOTES
Pt states that she tripped and fell outside and tried to catch herself. Pt complains of L wrist pain and swelling.

## 2025-04-10 NOTE — ED PROVIDER NOTES
EMERGENCY DEPARTMENT ENCOUNTER  Room Number:  S03/03  PCP: Concha Jackman MD  Independent Historians: Pt      HPI:  Chief Complaint: left wrist injury    A complete HPI/ROS/PMH/PSH/SH/FH are unobtainable due to: None    Chronic or social conditions impacting patient care (Social Determinants of Health): None      Context: Hanna Valladares is a 70 y.o. female with a medical history of restless leg syndrome who presents to the ED c/o acute left wrist injury sustained shortly prior to arrival status post trip and fall on outstretched hand.  Patient reports mechanical fall  while working in her garden on uneven ground this evening.  She fell on outstretched left wrist.  Small abrasion to left ankle but no other traumatic injuries reported.  Patient is right-hand dominant.      Review of prior external notes (non-ED) -and- Review of prior external test results outside of this encounter:  Hand surgery office follow-up note 1/7/2025 reviewed: History of ORIF right distal radius 7/25/2024      PAST MEDICAL HISTORY  Active Ambulatory Problems     Diagnosis Date Noted    Osteopenia 07/11/2017    History of colon polyps 07/17/2018    Anxiety 07/19/2019    Adenomatous polyp of colon 11/14/2019     Resolved Ambulatory Problems     Diagnosis Date Noted    No Resolved Ambulatory Problems     Past Medical History:   Diagnosis Date    Bunion of left foot     Colon polyps     Depression     Hammer toe of left foot     Osteoporosis     RLS (restless legs syndrome)     Seasonal allergies          PAST SURGICAL HISTORY  Past Surgical History:   Procedure Laterality Date    BREAST BIOPSY      BUNIONECTOMY Right     BUNIONECTOMY Left 1/20/2025    Procedure: BUNIONECTOMY WITH DISTAL  CHEVRON /AKIN OSTEOTOMIES;  Surgeon: Lew Guzman Jr., MD;  Location: [facility] [place] [facility] [place];  Service: Orthopedics;  Laterality: Left;    COLONOSCOPY N/A 08/30/2016    Procedure: COLONOSCOPY TO CECUM & T.I. WITH HOT SNARE POLYPECTOMIES AND COLD POLYPECTOMIES;   Surgeon: Beronica Jj MD;  Location: [facility] ENDOSCOPY;  Service:     COLONOSCOPY N/A 09/16/2020    Procedure: COLONOSCOPY TO CECUM AND TI WITH HOT AND COLD SNARE POLYPECTOMIES ;  Surgeon: Beronica Jj MD;  Location:  [facility] ENDOSCOPY;  Service: Gastroenterology;  Laterality: N/A;  PRE- HX COLON POLYPS  POST- POLYPS, DIVERTICULOSIS, HEMORRHOIDS    D & C HYSTEROSCOPY ENDOMETRIAL ABLATION      HAMMER TOE REPAIR Left 1/20/2025    Procedure: LEFT  SECOND/THIRD HAMMER TOE CORRECTION CORRECTION OF ANGULAR DEFORMITY WEIL OSTEOTOMY SOFT TISSUE PLICATION;  Surgeon: Lew Guzman Jr., MD;  [place]  [facility] [place] [facility] [place]  Service: Orthopedics;  Laterality: Left;    TONSILLECTOMY      WRIST FRACTURE SURGERY Right 07/2024         FAMILY HISTORY  Family History   Problem Relation Age of Onset    Hypertension Mother     Alzheimer's disease Mother     Macular degeneration Mother     Hip fracture Father     Parkinsonism Father     Leukemia Sister         passed away at 45     Nephrolithiasis Sister     No Known Problems Daughter     No Known Problems Son     No Known Problems Maternal Aunt     No Known Problems Paternal Aunt     No Known Problems Maternal Grandmother     Colon cancer Maternal Grandfather     No Known Problems Paternal Grandmother     No Known Problems Paternal Grandfather     BRCA 1/2 Neg Hx     Breast cancer Neg Hx     Endometrial cancer Neg Hx     Ovarian cancer Neg Hx     Malig Hyperthermia Neg Hx          SOCIAL HISTORY  Social History     Socioeconomic History    Marital status:      Spouse name: EVI    Number of children: 2   Tobacco Use    Smoking status: Former     Passive exposure: Past    Smokeless tobacco: Never    Tobacco comments:     quit 45 years ago   Vaping Use    Vaping status: Never Used   Substance and Sexual Activity    Alcohol use: Yes     Comment: social    Drug use: Never         ALLERGIES  Patient has no known allergies.      REVIEW OF SYSTEMS  Review of Systems  Included in  HPI  All systems reviewed and negative except for those discussed in HPI.      PHYSICAL EXAM    I have reviewed the triage vital signs and nursing notes.    ED Triage Vitals   Temp Heart Rate Resp BP SpO2   04/10/25 1733 04/10/25 1733 04/10/25 1733 04/10/25 1735 04/10/25 1733   98 °F (36.7 °C) 93 16 153/90 97 %      Temp src Heart Rate Source Patient Position BP Location FiO2 (%)   04/10/25 1733 04/10/25 1733 04/10/25 1735 -- --   Tympanic Monitor Sitting         Physical Exam    Physical Exam   Constitutional: No distress.  Nontoxic  HENT:  Head: Normocephalic and atraumatic.   Oropharynx: Mucous membranes are moist.   Eyes: . No scleral icterus. No conjunctival pallor.  Neck: Normal range of motion. Neck supple.   Cardiovascular: Pink warm and well perfused throughout.    Pulmonary/Chest: No respiratory distress.  No tachypnea or increased work of breathing appreciated.    Musculoskeletal: Moves all extremities equally.  Swelling ecchymosis dorsum left hand.  Full range of motion left wrist intact.  Sensation intact left hand throughout the median, radial, ulnar nerve distributions with strong left radial pulse noted.  Full finger extension and  strength intact.  Atraumatic left elbow and shoulder pain  Neurological: Alert and oriented.  No acute focal deficit appreciated.  Skin: Skin is pink, warm, and dry.   Psychiatric: Mood and affect normal.   Nursing note and vitals reviewed.             LAB RESULTS  No results found for this or any previous visit (from the past 24 hours).      RADIOLOGY  XR Wrist 3+ View Left  Result Date: 4/10/2025  XR WRIST 3+ VW LEFT-  HISTORY: 70-year-old female status post fall. Pain.  FINDINGS: There is no convincing evidence for an acute fracture and there is no malalignment. There is prominent soft tissue swelling and possibly hematoma at the dorsal aspect of the wrist overlying the carpal bones.          MEDICATIONS GIVEN IN ER  Medications   ibuprofen (ADVIL,MOTRIN) tablet 800  mg (has no administration in time range)   acetaminophen (TYLENOL) tablet 1,000 mg (has no administration in time range)         ORDERS PLACED DURING THIS VISIT:  Orders Placed This Encounter   Procedures    [facility] 05. Wrist Brace With ABD Thumb (); Left    XR Wrist 3+ View Left    Obtain & Apply The Following-         OUTPATIENT MEDICATION MANAGEMENT:  Current Facility-Administered Medications Ordered in Epic   Medication Dose Route Frequency Provider Last Rate Last Admin    acetaminophen (TYLENOL) tablet 1,000 mg  1,000 mg Oral Once Kyrie Blank MD        ibuprofen (ADVIL,MOTRIN) tablet 800 mg  800 mg Oral Once Kyrie Blank MD         Current Outpatient Medications Ordered in Epic   Medication Sig Dispense Refill    calcium carbonate-vitamin d 600-400 MG-UNIT per tablet Take 1 tablet by mouth Daily.      fluticasone (FLONASE) 50 MCG/ACT nasal spray Administer 1 spray into the nostril(s) as directed by provider Daily.      HYDROcodone-acetaminophen (NORCO) 5-325 MG per tablet Take 1-2 tablets by mouth Every 6 (Six) Hours As Needed for Pain (1 tab pain 3-6/10, 2 tab pain 7-10/10). 30 tablet 0    Loratadine-Pseudoephedrine (CLARITIN-D 24 HOUR PO) Take  by mouth.      multivitamin with minerals (MULTIVITAMIN ADULT PO) Take 1 tablet by mouth Daily. PT HOLDING FOR SURGERY      pramipexole (MIRAPEX) 0.5 MG tablet Take 1 tablet by mouth Every Night.      Probiotic Product (PROBIOTIC-10 PO) Take 1 capsule by mouth Daily.      sertraline (ZOLOFT) 50 MG tablet Take 1 tablet by mouth Daily. 90 tablet 3    Zinc 50 MG tablet Take 1 tablet by mouth Every Night. PT HOLDING FOR SURGERY           PROCEDURES  Procedures      SPLINT    Location: Left wrist  Type: Velcro thumb spica  Applied by ER tech  Following splinting, I have reexamined the extremity and noted no significant neurovascular change.        PROGRESS, DATA ANALYSIS, CONSULTS, AND MEDICAL DECISION MAKING  All labs have been independently interpreted  by me.  All radiology studies have been reviewed by me. All EKGs have been independently viewed and interpreted by me.  Discussion below represents my analysis of pertinent findings related to patient's condition, differential diagnosis, treatment plan and final disposition.    Differential diagnosis:   My differential diagnosis of the upper extremity pain or injury includes but is not limited to contusions of the shoulder, forearm, arm, wrist, elbow or hand, dislocations of shoulder, elbow, wrist, digits, nursemaid's elbow (age-appropriate), shoulder sprain, elbow sprain, wrist sprain, digit sprain, shoulder strain, arm strain, forearm strain, elbow strain, wrist strain, hand sprain, digit strain, lacerations of the upper extremity, fractures both closed and open of clavicle, scapula, humerus, radius, ulna, bones of the wrist, hands and digits, cellulitis or abscess, cervical radiculopathy, radial nerve palsy, neurogenic upper extremity pain, angina equivalent, SVT, DVT, arterial occlusion, compartment syndrome.'    Clinical Scores:                  ED Course as of 04/10/25 1918   Thu Apr 10, 2025   1855 RADIOLOGY      Study: Left wrist-3 views  Findings: No fracture or sublux identified  I independently viewed and interpreted these images contemporaneously with treatment.    [RS]   1917 Patient has hydrocodone at home available to her as needed.  She is also established with hand surgery and I recommend she follow-up in 1 week.  Plan splint.  Patient agreeable. [RS]      ED Course User Index  [RS] Kyrie Blank MD         Prescription drug monitoring program review:     AS OF 19:18 EDT VITALS:    BP - 153/90  HR - 93  TEMP - 98 °F (36.7 °C) (Tympanic)  O2 SATS - 97%    COMPLEXITY OF CARE  Admission was considered but after careful review of the patient's presentation, physical examination, diagnostic results, and response to treatment the patient may be safely discharged with outpatient  follow-up.      DIAGNOSIS  Final diagnoses:   Sprain of left wrist, initial encounter         DISPOSITION  ED Disposition       ED Disposition   Discharge    Condition   Stable    Comment   --                  DISCHARGE    Patient discharged in stable condition.    Reviewed implications of results, diagnosis, meds, responsibility to follow up, warning signs and symptoms of possible worsening, potential complications and reasons to return to ER.    Patient/Family voiced understanding of above instructions.    Discussed plan for discharge, as there is no emergent indication for admission. Patient referred to primary care provider for regular health maintenance. Pt/family is agreeable and understands need for follow up and possible repeat testing.  Pt is aware that discharge does not mean that nothing is wrong but it indicates no emergency is present that requires admission and they must continue care with follow-up as given below or physician of their choice.     FOLLOW-UP  Concha Jackman MD  [address] [address] [address]  [place] [facility] [id number] [phone]    Schedule an appointment as soon as possible for a visit   As needed    Beronica Galicia MD  [address] [facility] [place] [facility] [address]  [facility] [id number] [phone]    Schedule an appointment as soon as possible for a visit in 1 week  For wound re-check         Medication List      No changes were made to your prescriptions during this visit.           Please note that portions of this document were completed with a voice recognition program.    Note Disclaimer: At [facility], we believe that sharing information builds trust and better relationships. You are receiving this note because you recently visited [facility]. It is possible you will see health information before a provider has talked with you about it. This kind of information can be easy to misunderstand. To help you fully understand what it means for your health, we urge you to discuss this note with your  provider.         Kyrie Blank MD  04/10/25 1918

## 2025-07-29 ENCOUNTER — OFFICE VISIT (OUTPATIENT)
Dept: INTERNAL MEDICINE | Facility: CLINIC | Age: 70
End: 2025-07-29
Payer: COMMERCIAL

## 2025-07-29 VITALS
DIASTOLIC BLOOD PRESSURE: 76 MMHG | HEART RATE: 84 BPM | BODY MASS INDEX: 24.03 KG/M2 | HEIGHT: 62 IN | SYSTOLIC BLOOD PRESSURE: 118 MMHG | OXYGEN SATURATION: 98 % | WEIGHT: 130.6 LBS

## 2025-07-29 DIAGNOSIS — M80.00XD OSTEOPOROSIS WITH CURRENT PATHOLOGICAL FRACTURE WITH ROUTINE HEALING, UNSPECIFIED OSTEOPOROSIS TYPE, SUBSEQUENT ENCOUNTER: ICD-10-CM

## 2025-07-29 DIAGNOSIS — Z11.59 NEED FOR HEPATITIS C SCREENING TEST: ICD-10-CM

## 2025-07-29 DIAGNOSIS — F41.9 ANXIETY: ICD-10-CM

## 2025-07-29 DIAGNOSIS — Z00.00 ANNUAL PHYSICAL EXAM: Primary | ICD-10-CM

## 2025-07-29 DIAGNOSIS — Z12.11 ENCOUNTER FOR SCREENING FOR MALIGNANT NEOPLASM OF COLON: ICD-10-CM

## 2025-07-29 DIAGNOSIS — G25.81 RESTLESS LEG SYNDROME: ICD-10-CM

## 2025-07-29 DIAGNOSIS — D36.9 TUBULAR ADENOMA: ICD-10-CM

## 2025-07-29 DIAGNOSIS — Z87.311 HISTORY OF FRAGILITY FRACTURE: ICD-10-CM

## 2025-07-29 PROCEDURE — 90471 IMMUNIZATION ADMIN: CPT | Performed by: STUDENT IN AN ORGANIZED HEALTH CARE EDUCATION/TRAINING PROGRAM

## 2025-07-29 PROCEDURE — 90715 TDAP VACCINE 7 YRS/> IM: CPT | Performed by: STUDENT IN AN ORGANIZED HEALTH CARE EDUCATION/TRAINING PROGRAM

## 2025-07-29 PROCEDURE — 99387 INIT PM E/M NEW PAT 65+ YRS: CPT | Performed by: STUDENT IN AN ORGANIZED HEALTH CARE EDUCATION/TRAINING PROGRAM

## 2025-07-29 RX ORDER — AZELASTINE 1 MG/ML
2 SPRAY, METERED NASAL 2 TIMES DAILY
COMMUNITY

## 2025-07-29 NOTE — LETTER
[facility]  Vaccine Consent Form    Patient Name:  Hanna Valladares  Patient :  1955     Vaccine(s) Ordered    Tdap Vaccine Greater Than or Equal To 6yo IM        Screening Checklist  The following questions should be completed prior to vaccination. If you answer “yes” to any question, it does not necessarily mean you should not be vaccinated. It just means we may need to clarify or ask more questions. If a question is unclear, please ask your healthcare provider to explain it.    Yes No   Any fever or moderate to severe illness today (mild illness and/or antibiotic treatment are not contraindications)?     Do you have a history of a serious reaction to any previous vaccinations, such as anaphylaxis, encephalopathy within 7 days, Guillain-[place] syndrome within 6 weeks, seizure?     Have you received any live vaccine(s) (e.g MMR, AGAPITO) or any other vaccines in the last month (to ensure duplicate doses aren't given)?     Do you have an anaphylactic allergy to latex (DTaP, DTaP-IPV, Hep A, Hep B, MenB, RV, Td, Tdap), baker’s yeast (Hep B, HPV), polysorbates (RSV, nirsevimab, PCV 20 and 21, Rotavirrus, Tdap, Shingrix), or gelatin (AGAPITO, MMR)?     Do you have an anaphylactic allergy to neomycin (Rabies, AGAPITO, MMR, IPV, Hep A), polymyxin B (IPV), or streptomycin (IPV)?      Any cancer, leukemia, AIDS, or other immune system disorder? (AGAPITO, MMR, RV)     Do you have a parent, brother, or sister with an immune system problem (if immune competence of vaccine recipient clinically verified, can proceed)? (MMR, AGAPITO)     Any recent steroid treatments for >2 weeks, chemotherapy, or radiation treatment? (AGAPITO, MMR)     Have you received antibody-containing blood transfusions or IVIG in the past 11 months (recommended interval is dependent on product)? (MMR, AGAPITO)     Have you taken antiviral drugs (acyclovir, famciclovir, valacyclovir for AGAPITO) in the last 24 or 48 hours, respectively?      Are you pregnant or planning to  The access site was successfully closed using a (Sys Cirilo Vascade Clsr 6-12fr) closure device. "become pregnant within 1 month? (AGAPITO, MMR, HPV, IPV, MenB, Abrexvy; For Hep B- refer to Engerix-B; For RSV - Abrysvo is indicated for 32-36 weeks of pregnancy from September to January)     For infants, have you ever been told your child has had intussusception or a medical emergency involving obstruction of the intestine (Rotavirus)? If not for an infant, can skip this question.         *Ordering Physicians/APC should be consulted if \"yes\" is checked by the patient or guardian above.  I have received, read, and understand the Vaccine Information Statement (VIS) for each vaccine ordered.  I have considered my or my child's health status as well as the health status of my close contacts.  I have taken the opportunity to discuss my vaccine questions with my or my child's health care provider.   I have requested that the ordered vaccine(s) be given to me or my child.  I understand the benefits and risks of the vaccines.  I understand that I should remain in the clinic for 15 minutes after receiving the vaccine(s).  _________________________________________________________  Signature of Patient or Parent/Legal Guardian ____________________  Date     "

## 2025-07-29 NOTE — PROGRESS NOTES
"  Jose Miguel Lang M.D.  Internal Medicine  [facility]  [address], [address]  [place] [address]  [phone]      Chief Complaint  Establish Care and Annual Exam    SUBJECTIVE    History of Present Illness    Hanna Valladares is a 70 y.o. female with past medical history of anxiety, osteopenia, adenomatous polyp of colon who presents to the office today as a new patient to establish care.     History of Present Illness  The patient presents to establish care. She is here for an annual exam, which was due in January 2025. She has no specific health concerns at present. She has been under the care of Dr. Concha Fall since 2012, who prescribed sertraline for anxiety. Even though her anxiety has subsided, she feels she experiences \"hormonal imbalance\" and emotional instability when off the medication. She reports no feelings of depression or sadness and continues to enjoy her usual activities. She has never had a sick visit with Dr. Fall, except for a virtual consultation during a COVID-19 infection. She is requesting a refill of her sertraline prescription as it has run out.    She is uncertain about a diagnosis of osteoporosis or osteopenia but was on Fosamax for 5 years, which was recently discontinued. She is due for a DEXA scan in January 2026 with Dr. Brisa Crowder. She had a significant wrist fracture a year ago after falling down stairs, involving 3 to 4 bones, which required surgical intervention. Another wrist fracture occurred in April 2025, but did not necessitate surgery. Dr. Galicia did not express any concerns about her bone health. Her bone mineral density showed slight improvement on the last scan. She takes Caltrate D600 and engages in yard work and gardening for exercise. She avoids dairy products and takes One A Day Women's multivitamin.    She has restless legs syndrome and is on pramipexole 0.5 mg at night for that, which she finds effective. Occasionally, she " experiences minor episodes between doses.    She has been seeing an allergist annually for decongestants.    She had bunion surgery on 2 toes and hammertoes corrected in 2025. She also had a bunionectomy years ago.    She has a long-standing bump on her right anterior calf since her teenage years, which has remained unchanged.    She quit smoking in  after a 3-year habit and consumes 3 to 4 alcoholic beverages per week. She had a physical examination in 2025 and plans to transfer her records here. She is not fasting today. She has received 2 or 3 COVID-19 vaccines and is due for a tetanus booster.    The patient's sister  from leukemia. The patient's mother had Alzheimer's. The patient's father had Parkinson's.    Review of Systems    No Known Allergies     Outpatient Medications Marked as Taking for the 25 encounter (Office Visit) with Jose Miguel Lang MD   Medication Sig Dispense Refill    azelastine (ASTELIN) 0.1 % nasal spray Administer 2 sprays into the nostril(s) as directed by provider 2 (Two) Times a Day. Use in each nostril as directed      calcium carbonate-vitamin d 600-400 MG-UNIT per tablet Take 1 tablet by mouth Daily.      Chlorcyclizine-Pseudoephed (STAHIST AD PO) Take  by mouth.      multivitamin with minerals (MULTIVITAMIN ADULT PO) Take 1 tablet by mouth Daily. PT HOLDING FOR SURGERY      pramipexole (MIRAPEX) 0.5 MG tablet Take 1 tablet by mouth Every Night.      Probiotic Product (PROBIOTIC-10 PO) Take 1 capsule by mouth Daily.      sertraline (ZOLOFT) 50 MG tablet Take 1 tablet by mouth Daily. 90 tablet 3        Past Medical History:   Diagnosis Date    Bunion of left foot     Colon polyps     Depression     Hammer toe of left foot     Osteoporosis     RLS (restless legs syndrome)     Seasonal allergies      Past Surgical History:   Procedure Laterality Date    BREAST BIOPSY      BUNIONECTOMY Right     BUNIONECTOMY Left 2025    Procedure: BUNIONECTOMY WITH DISTAL   CHEVRON /AKIN OSTEOTOMIES;  Surgeon: Lew Guzman Jr., MD;  Location:  [facility];  Service: Orthopedics;  Laterality: Left;    COLONOSCOPY N/A 08/30/2016    Procedure: COLONOSCOPY TO CECUM & T.I. WITH HOT SNARE POLYPECTOMIES AND COLD POLYPECTOMIES;  Surgeon: Beronica Jj MD;  [facility];  Service:     COLONOSCOPY N/A 09/16/2020    Procedure: COLONOSCOPY TO CECUM AND TI WITH HOT AND COLD SNARE POLYPECTOMIES ;  Surgeon: Beronica Jj MD;  [facility];  Service: Gastroenterology;  Laterality: N/A;  PRE- HX COLON POLYPS  POST- POLYPS, DIVERTICULOSIS, HEMORRHOIDS    D & C HYSTEROSCOPY ENDOMETRIAL ABLATION      HAMMER TOE REPAIR Left 1/20/2025    Procedure: LEFT  SECOND/THIRD HAMMER TOE CORRECTION CORRECTION OF ANGULAR DEFORMITY WEIL OSTEOTOMY SOFT TISSUE PLICATION;  Surgeon: Lew Guzman Jr., MD;  Location: [facility];  Service: Orthopedics;  Laterality: Left;    TONSILLECTOMY      WRIST FRACTURE SURGERY Right 07/2024     Family History   Problem Relation Age of Onset    Hypertension Mother     Alzheimer's disease Mother     Macular degeneration Mother     Hearing loss Mother     Hip fracture Father     Parkinsonism Father     Hearing loss Father     Leukemia Sister         passed away at 45     Nephrolithiasis Sister     Cancer Sister         Leukemia    No Known Problems Daughter     No Known Problems Son     No Known Problems Maternal Aunt     No Known Problems Paternal Aunt     No Known Problems Maternal Grandmother     Colon cancer Maternal Grandfather     No Known Problems Paternal Grandmother     No Known Problems Paternal Grandfather     BRCA 1/2 Neg Hx     Breast cancer Neg Hx     Endometrial cancer Neg Hx     Ovarian cancer Neg Hx     Malig Hyperthermia Neg Hx     reports that she has quit smoking. Her smoking use included cigarettes. She has a 0.8 pack-year smoking history. She has been exposed to tobacco smoke. She has never used smokeless tobacco. She reports  "current alcohol use of about 4.0 standard drinks of alcohol per week. She reports that she does not use drugs.    OBJECTIVE    Vital Signs:   /76   Pulse 84   Ht 157.5 cm (62.01\")   Wt 59.2 kg (130 lb 9.6 oz)   SpO2 98%   BMI 23.88 kg/m²     Physical Exam  Constitutional:       Appearance: Normal appearance.   HENT:      Right Ear: Tympanic membrane normal.      Left Ear: Tympanic membrane normal.   Cardiovascular:      Rate and Rhythm: Normal rate and regular rhythm.      Heart sounds: Normal heart sounds. No murmur heard.  Pulmonary:      Effort: Pulmonary effort is normal.      Breath sounds: Normal breath sounds.   Abdominal:      General: Abdomen is flat. There is no distension.      Palpations: Abdomen is soft.      Tenderness: There is no abdominal tenderness.   Musculoskeletal:      Comments:  Firm, mobile bump noted on anterior leg, likely a cyst   Skin:     General: Skin is warm and dry.   Neurological:      Mental Status: She is alert.   Psychiatric:         Mood and Affect: Mood normal.         Behavior: Behavior normal.         Thought Content: Thought content normal.          Physical Exam      The following data was reviewed by: Jose Miguel Lang MD on 07/29/2025:  CMP          1/16/2025    14:59   CMP   Glucose 128    BUN 15    Creatinine 0.96    EGFR 63.8    Sodium 141    Potassium 4.1    Chloride 104    Calcium 10.0    Total Protein 6.8    Albumin 4.1    Globulin 2.7    Total Bilirubin 0.2    Alkaline Phosphatase 68    AST (SGOT) 21    ALT (SGPT) 14    Albumin/Globulin Ratio 1.5    BUN/Creatinine Ratio 15.6    Anion Gap 8.9      CBC w/diff          1/16/2025    14:59   CBC w/Diff   WBC 4.86    RBC 4.73    Hemoglobin 14.2    Hematocrit 42.2    MCV 89.2    MCH 30.0    MCHC 33.6    RDW 12.1    Platelets 217    Neutrophil Rel % 57.4    Immature Granulocyte Rel % 0.2    Lymphocyte Rel % 31.5    Monocyte Rel % 7.6    Eosinophil Rel % 2.5    Basophil Rel % 0.8                       ASSESSMENT & PLAN "        Annual physical exam  -Age and sex appropriate physical exam performed and documented. Updated past medical, family, social and surgical histories as well as allergies and care team list. Addressed care gaps listed in the medical record.  -Encouraged seat belt use for every car ride for patient and all occupants. Discussed securing of all guns in the home for patient and family protection. Encouraged sunscreen use to reduce risk of skin cancer for any days with sun exposure over 20 minutes. Recommended helmet if biking or riding motorcycle to prevent head trauma. Discussed the importance of smoke and carbon monoxide detectors in the home.   -Encouraged annual dental and vision exams as part of their overall health.  -Encouraged minimum of 30 minutes or more of exercise at a brisk walk or higher 5 days per week combined with a well-balanced diet.  -Immunizations reviewed and updated in EMR.  -Advised that all women who are planning or capable of pregnancy take a daily supplement containing 0.4 to 0.8 mg (400 to 800 ?g) of folic acid.  The ASCVD Risk score (Brittany DK, et al., 2019) failed to calculate for the following reasons:    Cannot find a previous HDL lab    Cannot find a previous total cholesterol lab   -Lipid screening:  Will screen for hyperlipidemia today and calculate ASCVD risk if appropriate.    -Aspirin for primary or secondary prevention: Not applicable, patient is greater than age 60 and risks outweigh benefits for primary prevention.  -Depression screening: PHQ2 performed and the patient's screen was negative.  -Diabetes screening:  screen today  -Hypertension screening: Patient screened negative for HTN today.  -Hepatitis C virus screening:  screen today  -Colon cancer screening: she is due  -Lung cancer screening: Patient has smoked but does not meet other eligibility criteria for screening.  -Breast cancer screening: Per GYN  -Osteoporosis screening: Planning to do with GYN.     Orders:     Comprehensive Metabolic Panel    CBC & Differential    Lipid Panel    TSH Rfx On Abnormal To Free T4    Hemoglobin A1c    History of fragility fracture  Visited with [facility].  Left distal radius fracture.  Also prior ORIF of the right distal radius in July 2024. Patient sustained a new injury to her left wrist from a fall on 4/10. This injury has been treated nonoperatively. She has done very well and is no longer wearing a brace. She has regained significant range of motion. Went to PT and was released.          Osteoporosis with current pathological fracture with routine healing, unspecified osteoporosis type, subsequent encounter  DEXA scan from 2022 reviewed which showed osteopenia and she was advised to continue Fosamax. Was on for 5 years. Now off and states she is due for repeat DEXA. No formal exercise.   I recommend to get 1200 mg of calcium and 1000 IUs of vitamin D through diet and supplements and to participate in a weight based exercise to prevent loss of bone mineral density.   Following with GYN and planning to get DEXA there.       Anxiety  - on sertraline. Gets teary if she does not take.  Continue current management.       Restless leg syndrome  - on pramipexole which works well for her.  Continue current management       Encounter for screening for malignant neoplasm of colon    Orders:    Ambulatory Referral For Screening Colonoscopy    Tubular adenoma    Orders:    Ambulatory Referral For Screening Colonoscopy    Need for hepatitis C screening test    Orders:    Hepatitis C Antibody      Assessment & Plan        Health Maintenance Due   Topic Date Due    TDAP/TD VACCINES (1 - Tdap) Never done    HEPATITIS C SCREENING  Never done    ANNUAL PHYSICAL  07/19/2020    COLORECTAL CANCER SCREENING  09/16/2023    COVID-19 Vaccine (4 - 2024-25 season) 09/01/2024    DXA SCAN  10/04/2024        Follow Up  No follow-ups on file.    Patient/family had no further questions at this time  and verbalized understanding of the plan discussed today.     Patient or patient representative verbalized consent for the use of Ambient Listening during the visit with  Jose Miguel Lang MD for chart documentation. 7/29/2025  14:40 EDT

## 2025-08-01 ENCOUNTER — PATIENT ROUNDING (BHMG ONLY) (OUTPATIENT)
Dept: INTERNAL MEDICINE | Facility: CLINIC | Age: 70
End: 2025-08-01
Payer: COMMERCIAL

## 2025-08-02 LAB
ALBUMIN SERPL-MCNC: 4.5 G/DL (ref 3.5–5.2)
ALBUMIN/GLOB SERPL: 2 G/DL
ALP SERPL-CCNC: 85 U/L (ref 39–117)
ALT SERPL-CCNC: 14 U/L (ref 1–33)
AST SERPL-CCNC: 22 U/L (ref 1–32)
BASOPHILS # BLD AUTO: 0.04 10*3/MM3 (ref 0–0.2)
BASOPHILS NFR BLD AUTO: 0.9 % (ref 0–1.5)
BILIRUB SERPL-MCNC: 0.5 MG/DL (ref 0–1.2)
BUN SERPL-MCNC: 15 MG/DL (ref 8–23)
BUN/CREAT SERPL: 15.8 (ref 7–25)
CALCIUM SERPL-MCNC: 10.6 MG/DL (ref 8.6–10.5)
CHLORIDE SERPL-SCNC: 103 MMOL/L (ref 98–107)
CHOLEST SERPL-MCNC: 207 MG/DL (ref 0–200)
CO2 SERPL-SCNC: 29.3 MMOL/L (ref 22–29)
CREAT SERPL-MCNC: 0.95 MG/DL (ref 0.57–1)
EGFRCR SERPLBLD CKD-EPI 2021: 64.6 ML/MIN/1.73
EOSINOPHIL # BLD AUTO: 0.05 10*3/MM3 (ref 0–0.4)
EOSINOPHIL NFR BLD AUTO: 1.1 % (ref 0.3–6.2)
ERYTHROCYTE [DISTWIDTH] IN BLOOD BY AUTOMATED COUNT: 13.2 % (ref 12.3–15.4)
GLOBULIN SER CALC-MCNC: 2.2 GM/DL
GLUCOSE SERPL-MCNC: 92 MG/DL (ref 65–99)
HBA1C MFR BLD: 5.1 % (ref 4.8–5.6)
HCT VFR BLD AUTO: 45.4 % (ref 34–46.6)
HCV IGG SERPL QL IA: NON REACTIVE
HDLC SERPL-MCNC: 89 MG/DL (ref 40–60)
HGB BLD-MCNC: 14.3 G/DL (ref 12–15.9)
IMM GRANULOCYTES # BLD AUTO: 0.01 10*3/MM3 (ref 0–0.05)
IMM GRANULOCYTES NFR BLD AUTO: 0.2 % (ref 0–0.5)
LDLC SERPL CALC-MCNC: 104 MG/DL (ref 0–100)
LYMPHOCYTES # BLD AUTO: 1.37 10*3/MM3 (ref 0.7–3.1)
LYMPHOCYTES NFR BLD AUTO: 30 % (ref 19.6–45.3)
MCH RBC QN AUTO: 29.5 PG (ref 26.6–33)
MCHC RBC AUTO-ENTMCNC: 31.5 G/DL (ref 31.5–35.7)
MCV RBC AUTO: 93.8 FL (ref 79–97)
MONOCYTES # BLD AUTO: 0.37 10*3/MM3 (ref 0.1–0.9)
MONOCYTES NFR BLD AUTO: 8.1 % (ref 5–12)
NEUTROPHILS # BLD AUTO: 2.73 10*3/MM3 (ref 1.7–7)
NEUTROPHILS NFR BLD AUTO: 59.7 % (ref 42.7–76)
PLATELET # BLD AUTO: 200 10*3/MM3 (ref 140–450)
POTASSIUM SERPL-SCNC: 5.3 MMOL/L (ref 3.5–5.2)
PROT SERPL-MCNC: 6.7 G/DL (ref 6–8.5)
RBC # BLD AUTO: 4.84 10*6/MM3 (ref 3.77–5.28)
SODIUM SERPL-SCNC: 142 MMOL/L (ref 136–145)
TRIGL SERPL-MCNC: 81 MG/DL (ref 0–150)
TSH SERPL DL<=0.005 MIU/L-ACNC: 2.55 UIU/ML (ref 0.27–4.2)
VLDLC SERPL CALC-MCNC: 14 MG/DL (ref 5–40)
WBC # BLD AUTO: 4.57 10*3/MM3 (ref 3.4–10.8)

## (undated) DEVICE — CANN O2 ETCO2 FITS ALL CONN CO2 SMPL A/ 7IN DISP LF

## (undated) DEVICE — PENCL SMOKE/EVAC MEGADYNE TELESCP 10FT

## (undated) DEVICE — THE TORRENT IRRIGATION SCOPE CONNECTOR IS USED WITH THE TORRENT IRRIGATION TUBING TO PROVIDE IRRIGATION FLUIDS SUCH AS STERILE WATER DURING GASTROINTESTINAL ENDOSCOPIC PROCEDURES WHEN USED IN CONJUNCTION WITH AN IRRIGATION PUMP (OR ELECTROSURGICAL UNIT).: Brand: TORRENT

## (undated) DEVICE — GLV SURG BIOGEL LTX PF 8

## (undated) DEVICE — BANDAGE,GAUZE,CONFORMING,4"X75",STRL,LF: Brand: MEDLINE

## (undated) DEVICE — STOCKINETTE,IMPERVIOUS,12X48,STERILE: Brand: MEDLINE

## (undated) DEVICE — APPL CHLORAPREP HI/LITE 26ML ORNG

## (undated) DEVICE — SOL ISO/ALC 70PCT 4OZ

## (undated) DEVICE — SENSR O2 OXIMAX FNGR A/ 18IN NONSTR

## (undated) DEVICE — SPONGE,LAP,18"X18",DLX,XR,ST,5/PK,40/PK: Brand: MEDLINE

## (undated) DEVICE — SNAR POLYP SENSATION STDOVL 27 240 BX40

## (undated) DEVICE — THE SINGLE USE ETRAP – POLYP TRAP IS USED FOR SUCTION RETRIEVAL OF ENDOSCOPICALLY REMOVED POLYPS.: Brand: ETRAP

## (undated) DEVICE — PK ORTHO MINOR TOWER 40

## (undated) DEVICE — GUIDEWIRE, NON-THREADED, 1.1 X 150MM: Brand: PENDING

## (undated) DEVICE — UNDERCAST PADDING: Brand: DEROYAL

## (undated) DEVICE — DRESSING,GAUZE,XEROFORM,CURAD,1"X8",ST: Brand: CURAD

## (undated) DEVICE — DRAPE,U/ SHT,SPLIT,PLAS,STERIL: Brand: MEDLINE

## (undated) DEVICE — KT ORCA ORCAPOD DISP STRL

## (undated) DEVICE — DISPOSABLE TOURNIQUET CUFF SINGLE BLADDER, SINGLE PORT AND QUICK CONNECT CONNECTOR: Brand: COLOR CUFF

## (undated) DEVICE — LN SMPL CO2 SHTRM SD STREAM W/M LUER

## (undated) DEVICE — ADAPT CLN BIOGUARD AIR/H2O DISP

## (undated) DEVICE — PATIENT RETURN ELECTRODE, SINGLE-USE, CONTACT QUALITY MONITORING, ADULT, WITH 9FT CORD, FOR PATIENTS WEIGING OVER 33LBS. (15KG): Brand: MEGADYNE

## (undated) DEVICE — SUT VIC 2/0 CT2 27IN J269H

## (undated) DEVICE — GOWN,REINF,POLY,SIRUS,BREATH SLV,XLNG/XL: Brand: MEDLINE

## (undated) DEVICE — SUT VIC 3/0 CT2 27IN J232H

## (undated) DEVICE — GLV SURG SIGNATURE ESSENTIAL PF LTX SZ8

## (undated) DEVICE — STCKNT IMPERV 9X36IN STRL

## (undated) DEVICE — GLV SURG PREMIERPRO ORTHO LTX PF SZ8 BRN

## (undated) DEVICE — BNDG,ELSTC,MATRIX,STRL,6"X5YD,LF,HOOK&LP: Brand: MEDLINE

## (undated) DEVICE — SINGLE-USE BIOPSY FORCEPS: Brand: RADIAL JAW 4

## (undated) DEVICE — INTENDED FOR TISSUE SEPARATION, AND OTHER PROCEDURES THAT REQUIRE A SHARP SURGICAL BLADE TO PUNCTURE OR CUT.: Brand: BARD-PARKER ® CARBON RIB-BACK BLADES

## (undated) DEVICE — SPNG GZ WOVN 4X4IN 12PLY 10/BX STRL

## (undated) DEVICE — BNDG,ELSTC,MATRIX,STRL,4"X5YD,LF,HOOK&LP: Brand: MEDLINE

## (undated) DEVICE — KT DRP MINIVIEW STRL

## (undated) DEVICE — TUBING, SUCTION, 1/4" X 10', STRAIGHT: Brand: MEDLINE

## (undated) DEVICE — MICRO SAGITTAL BLADE OFFSET (5.5 X 0.38 X 29.6MM)

## (undated) DEVICE — PRECISION THIN (9.0 X 0.38 X 25.0MM)

## (undated) DEVICE — SUT ETHLN 3/0 PC5 18IN 1893G

## (undated) DEVICE — TRAP FLD MINIVAC MEGADYNE 100ML